# Patient Record
Sex: MALE | Race: WHITE | Employment: OTHER | URBAN - METROPOLITAN AREA
[De-identification: names, ages, dates, MRNs, and addresses within clinical notes are randomized per-mention and may not be internally consistent; named-entity substitution may affect disease eponyms.]

---

## 2017-02-06 ENCOUNTER — OFFICE VISIT (OUTPATIENT)
Dept: INTERNAL MEDICINE CLINIC | Age: 72
End: 2017-02-06

## 2017-02-06 VITALS
RESPIRATION RATE: 12 BRPM | SYSTOLIC BLOOD PRESSURE: 132 MMHG | TEMPERATURE: 98.2 F | DIASTOLIC BLOOD PRESSURE: 74 MMHG | WEIGHT: 199.4 LBS | HEART RATE: 86 BPM | OXYGEN SATURATION: 98 % | HEIGHT: 67 IN | BODY MASS INDEX: 31.3 KG/M2

## 2017-02-06 DIAGNOSIS — Z23 ENCOUNTER FOR IMMUNIZATION: ICD-10-CM

## 2017-02-06 DIAGNOSIS — I10 ESSENTIAL HYPERTENSION: Primary | ICD-10-CM

## 2017-02-06 DIAGNOSIS — M25.561 RIGHT KNEE PAIN, UNSPECIFIED CHRONICITY: ICD-10-CM

## 2017-02-06 RX ORDER — HYDROCHLOROTHIAZIDE 25 MG/1
25 TABLET ORAL DAILY
Qty: 90 TAB | Refills: 3 | Status: SHIPPED | OUTPATIENT
Start: 2017-02-06 | End: 2018-02-12 | Stop reason: SDUPTHER

## 2017-02-06 NOTE — PROGRESS NOTES
Mitra Borja 1945, is a 73 y.o. male, who is seen today for reevaluation of hypertension and mild obesity and impaired fasting glucose but also complaining of right knee pain. He was hiking in May last year when he jumped across some rocks and started having some pain in his right knee and has not totally gone away. He has had some stiffness in the knees for 20 years but not like this. He did see an orthopedist in [place] and was given cortisone injection without benefit. He says there has been some swelling especially early on and he iced it. He takes his blood pressure medicine regularly and otherwise feels well. Past Medical History   Diagnosis Date    GERD (gastroesophageal reflux disease)     Hypertension     Impaired fasting glucose February 2010     Current Outpatient Prescriptions   Medication Sig Dispense Refill    hydroCHLOROthiazide (HYDRODIURIL) 25 mg tablet Take 1 Tab by mouth daily. 90 Tab 3     Visit Vitals    /74    Pulse 86    Temp 98.2 °F (36.8 °C) (Oral)    Resp 12    Ht 5' 7\" (1.702 m)    Wt 199 lb 6.4 oz (90.4 kg)    SpO2 98%    BMI 31.23 kg/m2     Carotids are 2+ without bruits. Lungs are clear to percussion. Good breath sounds with no wheezing or crackles. Heart reveals a regular rhythm with normal S1 and S2 without murmur gallop click or rub. Apical impulse is not palpable. Abdomen is soft and nontender with no hepatosplenomegaly or masses and no bruits. Extremities reveal no clubbing cyanosis or edema. There is some slight popping of the patella with range of motion of the right knee but good stability of the cruciate and collateral ligaments and no effusion. He points to the area of pain which is medially in the lower knee along the joint line region. Assessment: #1. Hypertension controlled. He will continue hydrochlorothiazide 25 mg daily. #2 knee pain probably partly related injury but also arthritic changes on x-rays.   If this significantly worsens he will see his orthopedist again. #3.  Mild obesity, he will remain active healthy diet and avoid further weight gain. Follow-up in about 6 months he should have some lab done in the year. He will receive influenza vaccination and PPSV- 23 now    Vignesh Finch. Cedrick Johnston MD FACP    Please note: This document has been produced using voice recognition software. Unrecognized errors in transcription may be present.

## 2017-02-06 NOTE — MR AVS SNAPSHOT
Visit Information Date & Time Provider Department Dept. Phone Encounter #  
 2/6/2017  3:00 PM Carrie Schwartz MD [facility] of [address] [id number] Follow-up Instructions Return in about 6 months (around 8/6/2017). Upcoming Health Maintenance Date Due DTaP/Tdap/Td series (1 - Tdap) 7/7/1968 GLAUCOMA SCREENING Q2Y 7/7/2012 MEDICARE YEARLY EXAM 7/7/2012 COLONOSCOPY 1/11/2016 Allergies as of 2/6/2017  Review Complete On: 2/6/2017 By: Carrie Schwartz MD  
  
 Severity Noted Reaction Type Reactions Omeprazole High 06/17/2015    Hives, Swelling, Angioedema Current Immunizations  Reviewed on 2/6/2017 Name Date Influenza High Dose Vaccine PF  Incomplete, 10/9/2014 Influenza Vaccine (Quad) PF 12/23/2015 10:41 AM  
 Pneumococcal Conjugate (PCV-13) 12/23/2015 10:40 AM  
 Pneumococcal Polysaccharide (PPSV-23)  Incomplete Zoster 10/20/2009 Reviewed by Carrie Schwartz MD on 2/6/2017 at  3:29 PM  
You Were Diagnosed With   
  
 Codes Comments Essential hypertension    -  Primary ICD-10-CM: I10 
ICD-9-CM: 401.9 Encounter for immunization     ICD-10-CM: W72 ICD-9-CM: V03.89 Right knee pain, unspecified chronicity     ICD-10-CM: M25.561 ICD-9-CM: 719.46 Vitals BP Pulse Temp Resp Height(growth percentile) Weight(growth percentile) 132/74 86 98.2 °F (36.8 °C) (Oral) 12 5' 7\" (1.702 m) 199 lb 6.4 oz (90.4 kg) SpO2 BMI Smoking Status 98% 31.23 kg/m2 Never Smoker Vitals History BMI and BSA Data Body Mass Index Body Surface Area  
 31.23 kg/m 2 2.07 m 2 Preferred Pharmacy Pharmacy Name Phone [place] [id number] [facility] [address] [place] [address] [phone] Your Updated Medication List  
  
   
This list is accurate as of: 2/6/17  3:52 PM.  Always use your most recent med list.  
  
  
  
  
 hydroCHLOROthiazide 25 mg tablet Commonly known as:  HYDRODIURIL Take 1 Tab by mouth daily. Prescriptions Sent to Pharmacy Refills  
 hydroCHLOROthiazide (HYDRODIURIL) 25 mg tablet 3 Sig: Take 1 Tab by mouth daily. Class: Normal  
 Pharmacy: [address] [place] [address], [facility] [place] [address] #: [phone] Route: Oral  
  
We Performed the Following INFLUENZA VIRUS VACCINE, HIGH DOSE SEASONAL, PRESERVATIVE FREE [52412 CPT(R)] PNEUMOCOCCAL POLYSACCHARIDE VACCINE, 23-VALENT, ADULT OR IMMUNOSUPPRESSED PT DOSE, [46923 CPT(R)] Follow-up Instructions Return in about 6 months (around 8/6/2017). Introducing [address]! [facility] introduces [employer] patient portal. Now you can access parts of your medical record, email your doctor's office, and request medication refills online. 1. In your internet browser, go to [email or web address] 2. Click on the First Time User? Click Here link in the Sign In box. You will see the New Member Sign Up page. 3. Enter your [employer] Access Code exactly as it appears below. You will not need to use this code after youve completed the sign-up process. If you do not sign up before the expiration date, you must request a new code. · [employer] Access Code: [email or web address] Expires: 5/7/2017  3:01 PM 
 
4. Enter the last four digits of your Social Security Number (xxxx) and Date of Birth (mm/dd/yyyy) as indicated and click Submit. You will be taken to the next sign-up page. 5. Create a MyCoopt ID. This will be your [employer] login ID and cannot be changed, so think of one that is secure and easy to remember. 6. Create a [employer] password. You can change your password at any time. 7. Enter your Password Reset Question and Answer. This can be used at a later time if you forget your password. 8. Enter your e-mail address. You will receive e-mail notification when new information is available in [address] 9. Click Sign Up. You can now view and download portions of your medical record. 10. Click the Download Summary menu link to download a portable copy of your medical information. If you have questions, please visit the Frequently Asked Questions section of the [employer] website. Remember, [employer] is NOT to be used for urgent needs. For medical emergencies, dial 911. Now available from your iPhone and Android! Please provide this summary of care documentation to your next provider. Your primary care clinician is listed as Gonzalez Paredes. If you have any questions after today's visit, please call [phone].

## 2017-02-28 ENCOUNTER — OFFICE VISIT (OUTPATIENT)
Dept: INTERNAL MEDICINE CLINIC | Age: 72
End: 2017-02-28

## 2017-02-28 VITALS
WEIGHT: 197.2 LBS | SYSTOLIC BLOOD PRESSURE: 134 MMHG | HEART RATE: 89 BPM | BODY MASS INDEX: 30.95 KG/M2 | RESPIRATION RATE: 12 BRPM | HEIGHT: 67 IN | TEMPERATURE: 98.2 F | DIASTOLIC BLOOD PRESSURE: 84 MMHG | OXYGEN SATURATION: 98 %

## 2017-02-28 DIAGNOSIS — L03.032 PARONYCHIA OF TOE OF LEFT FOOT: Primary | ICD-10-CM

## 2017-02-28 RX ORDER — CEPHALEXIN 500 MG/1
500 CAPSULE ORAL 4 TIMES DAILY
Qty: 40 CAP | Refills: 0 | Status: SHIPPED | OUTPATIENT
Start: 2017-02-28 | End: 2019-09-04 | Stop reason: ALTCHOICE

## 2017-02-28 NOTE — MR AVS SNAPSHOT
Visit Information Date & Time Provider Department Dept. Phone Encounter #  
 2/28/2017  4:30 PM Brandon Will MD Internist of [address] [id number] Upcoming Health Maintenance Date Due DTaP/Tdap/Td series (1 - Tdap) 7/7/1968 GLAUCOMA SCREENING Q2Y 7/7/2012 MEDICARE YEARLY EXAM 7/7/2012 COLONOSCOPY 1/11/2016 Allergies as of 2/28/2017  Review Complete On: 2/28/2017 By: Brandon Will MD  
  
 Severity Noted Reaction Type Reactions Omeprazole High 06/17/2015    Hives, Swelling, Angioedema Current Immunizations  Reviewed on 2/6/2017 Name Date Influenza High Dose Vaccine PF 2/6/2017  3:52 PM, 10/9/2014 Influenza Vaccine (Quad) PF 12/23/2015 10:41 AM  
 Pneumococcal Conjugate (PCV-13) 12/23/2015 10:40 AM  
 Pneumococcal Polysaccharide (PPSV-23) 2/6/2017  3:53 PM  
 Zoster 10/20/2009 Not reviewed this visit You Were Diagnosed With   
  
 Codes Comments Paronychia of toe of left foot    -  Primary ICD-10-CM: P56.845 
ICD-9-CM: 681.11 Vitals BP  
  
  
  
  
  
 134/84 Vitals History BMI and BSA Data Body Mass Index Body Surface Area  
 30.89 kg/m 2 2.06 m 2 Preferred Pharmacy Pharmacy Name Phone [facility] [address] [phone] Your Updated Medication List  
  
   
This list is accurate as of: 2/28/17  5:09 PM.  Always use your most recent med list.  
  
  
  
  
 cephALEXin 500 mg capsule Commonly known as:  Negro Balboa Take 1 Cap by mouth four (4) times daily. hydroCHLOROthiazide 25 mg tablet Commonly known as:  HYDRODIURIL Take 1 Tab by mouth daily. Prescriptions Printed Refills  
 cephALEXin (KEFLEX) 500 mg capsule 0 Sig: Take 1 Cap by mouth four (4) times daily. Class: Print Route: Oral  
  
Introducing [facility]!    
 Diya Patel introduces [employer] patient portal. Now you can access parts of your medical record, email your doctor's office, and request medication refills online. 1. In your internet browser, go to [email or web address] 2. Click on the First Time User? Click Here link in the Sign In box. You will see the New Member Sign Up page. 3. Enter your [employer] Access Code exactly as it appears below. You will not need to use this code after youve completed the sign-up process. If you do not sign up before the expiration date, you must request a new code. · [employer] Access Code: [email or web address] Expires: 5/7/2017  3:01 PM 
 
4. Enter the last four digits of your Social Security Number (xxxx) and Date of Birth (mm/dd/yyyy) as indicated and click Submit. You will be taken to the next sign-up page. 5. Create a [employer] ID. This will be your [employer] login ID and cannot be changed, so think of one that is secure and easy to remember. 6. Create a [employer] password. You can change your password at any time. 7. Enter your Password Reset Question and Answer. This can be used at a later time if you forget your password. 8. Enter your e-mail address. You will receive e-mail notification when new information is available in [address] 9. Click Sign Up. You can now view and download portions of your medical record. 10. Click the Download Summary menu link to download a portable copy of your medical information. If you have questions, please visit the Frequently Asked Questions section of the [employer] website. Remember, [employer] is NOT to be used for urgent needs. For medical emergencies, dial 911. Now available from your iPhone and Android! Please provide this summary of care documentation to your next provider. Your primary care clinician is listed as Isela Driscoll. Tony Chen. If you have any questions after today's visit, please call [phone].

## 2017-02-28 NOTE — PROGRESS NOTES
Iesha Mancini 1945, is a 73 y.o. male, who is seen today for redness and soreness of his left great toe. This started 2 days ago and has gotten somewhat worse. It hurts a little bit to walk around but is very red. He has never had anything like this before. He has no history of gout. Past Medical History:   Diagnosis Date    GERD (gastroesophageal reflux disease)     Hypertension     Impaired fasting glucose February 2010     Current Outpatient Prescriptions   Medication Sig Dispense Refill    cephALEXin (KEFLEX) 500 mg capsule Take 1 Cap by mouth four (4) times daily. 40 Cap 0    hydroCHLOROthiazide (HYDRODIURIL) 25 mg tablet Take 1 Tab by mouth daily. 90 Tab 3     Visit Vitals    /84    Pulse 89    Temp 98.2 °F (36.8 °C) (Oral)    Resp 12    Ht 5' 7\" (1.702 m)    Wt 197 lb 3.2 oz (89.4 kg)    SpO2 98%    BMI 30.89 kg/m2     The right great toe reveals a lot of redness around the nail which is quite mycotic. Minimal tenderness of these tissues and no tenderness to press on the nail or nailbed. Good range of motion of the MTP joint without pain. Redness is distal to that area anyway. He has a good dorsalis pedis and posterior tibialis pulse. Paronychia probably strep, there is no evidence of abscess. Will treat with cephalexin 500 mg 4 times daily for 10 days and he will soak the foot in warm water intermittently. Follow-up as previously planned and he will let me know if this does not get better. Jamil Lainez MD FACP    Please note: This document has been produced using voice recognition software. Unrecognized errors in transcription may be present.

## 2017-03-02 ENCOUNTER — TELEPHONE (OUTPATIENT)
Dept: INTERNAL MEDICINE CLINIC | Age: 72
End: 2017-03-02

## 2017-03-02 RX ORDER — CLINDAMYCIN HYDROCHLORIDE 300 MG/1
300 CAPSULE ORAL 3 TIMES DAILY
Qty: 21 CAP | Refills: 0 | Status: SHIPPED | OUTPATIENT
Start: 2017-03-02 | End: 2017-07-14 | Stop reason: SDUPTHER

## 2017-03-02 NOTE — TELEPHONE ENCOUNTER
Pt's wife called- pt is taking Keflex since 02/28 is now breaking out with a rash around his mouth.     Please advise [phone]

## 2017-03-02 NOTE — TELEPHONE ENCOUNTER
Víctor Hall MD   You 5 minutes ago (11:54 AM)                 It sounds like an unusual reaction but I did send in a different antibiotic.  (Routing comment)                   Pt aware of message from RM

## 2017-04-14 ENCOUNTER — TELEPHONE (OUTPATIENT)
Dept: INTERNAL MEDICINE CLINIC | Age: 72
End: 2017-04-14

## 2017-04-14 DIAGNOSIS — M25.561 RIGHT KNEE PAIN, UNSPECIFIED CHRONICITY: Primary | ICD-10-CM

## 2017-04-14 NOTE — TELEPHONE ENCOUNTER
Patient would like referral to Dr Seng Yee in  [place] for [facility] Lo Therapy.     His appt is on May 11th 2017    Tel [phone]  Fax [phone]

## 2017-07-14 ENCOUNTER — OFFICE VISIT (OUTPATIENT)
Dept: INTERNAL MEDICINE CLINIC | Age: 72
End: 2017-07-14

## 2017-07-14 VITALS
RESPIRATION RATE: 14 BRPM | WEIGHT: 198.4 LBS | HEART RATE: 91 BPM | BODY MASS INDEX: 31.14 KG/M2 | TEMPERATURE: 98.2 F | HEIGHT: 67 IN | SYSTOLIC BLOOD PRESSURE: 122 MMHG | OXYGEN SATURATION: 98 % | DIASTOLIC BLOOD PRESSURE: 84 MMHG

## 2017-07-14 DIAGNOSIS — L03.115 CELLULITIS OF FOOT, RIGHT: Primary | ICD-10-CM

## 2017-07-14 RX ORDER — CLINDAMYCIN HYDROCHLORIDE 300 MG/1
300 CAPSULE ORAL 3 TIMES DAILY
Qty: 30 CAP | Refills: 0 | Status: SHIPPED | OUTPATIENT
Start: 2017-07-14 | End: 2019-09-04 | Stop reason: ALTCHOICE

## 2017-07-14 RX ORDER — TRIAMCINOLONE ACETONIDE 5 MG/G
CREAM TOPICAL
Qty: 15 G | Refills: 0 | Status: SHIPPED | OUTPATIENT
Start: 2017-07-14 | End: 2019-09-04 | Stop reason: ALTCHOICE

## 2017-07-14 NOTE — PROGRESS NOTES
Steve Garay 1945, is a 81 y.o. male, who is seen today for rash on his right lateral malleolus. He notes that yesterday and he tried using Neosporin without benefit. It is a little itchy but does not hurt. He had not noticed anything previously. No chills sweats or fever. No allergy to any antibiotics. Past Medical History:   Diagnosis Date    GERD (gastroesophageal reflux disease)     Hypertension     Impaired fasting glucose February 2010     Current Outpatient Prescriptions   Medication Sig Dispense Refill    clindamycin (CLEOCIN) 300 mg capsule Take 1 Cap by mouth three (3) times daily. 30 Cap 0    triamcinolone (ARISTOCORT) 0.5 % topical cream Apply a thin layer twice daily 15 g 0    hydroCHLOROthiazide (HYDRODIURIL) 25 mg tablet Take 1 Tab by mouth daily. 90 Tab 3    cephALEXin (KEFLEX) 500 mg capsule Take 1 Cap by mouth four (4) times daily. 40 Cap 0     Visit Vitals    /84    Pulse 91    Temp 98.2 °F (36.8 °C) (Oral)    Resp 14    Ht 5' 7\" (1.702 m)    Wt 198 lb 6.4 oz (90 kg)    SpO2 98%    BMI 31.07 kg/m2     There is a rounded area of somewhat raised dermatitis, sharply demarcated over the right lateral malleolus about 5 cm in diameter. No satellite areas. No tenderness. No weeping. Assessment: Area of dermatitis that is probably cellulitis slowly could be a component of contact dermatitis. Will treat with clindamycin 300 mg 3 times daily for 10 days and with triamcinolone cream 0.5% twice daily. Follow-up as previously planned    Hemant Muniz. Priyanka Berrios MD FACP    Please note: This document has been produced using voice recognition software. Unrecognized errors in transcription may be present.

## 2018-02-15 RX ORDER — HYDROCHLOROTHIAZIDE 25 MG/1
25 TABLET ORAL DAILY
Qty: 90 TAB | Refills: 3 | Status: SHIPPED | OUTPATIENT
Start: 2018-02-15 | End: 2018-12-19 | Stop reason: SDUPTHER

## 2019-02-21 RX ORDER — HYDROCHLOROTHIAZIDE 25 MG/1
TABLET ORAL
Qty: 90 TAB | Refills: 0 | Status: SHIPPED | OUTPATIENT
Start: 2019-02-21 | End: 2019-09-04 | Stop reason: SDUPTHER

## 2019-08-27 RX ORDER — HYDROCHLOROTHIAZIDE 25 MG/1
TABLET ORAL
Qty: 90 TAB | Status: CANCELLED | OUTPATIENT
Start: 2019-08-27

## 2019-09-04 ENCOUNTER — OFFICE VISIT (OUTPATIENT)
Dept: INTERNAL MEDICINE CLINIC | Age: 74
End: 2019-09-04

## 2019-09-04 VITALS
WEIGHT: 199 LBS | BODY MASS INDEX: 31.23 KG/M2 | OXYGEN SATURATION: 98 % | TEMPERATURE: 97.8 F | HEART RATE: 69 BPM | SYSTOLIC BLOOD PRESSURE: 124 MMHG | HEIGHT: 67 IN | DIASTOLIC BLOOD PRESSURE: 72 MMHG | RESPIRATION RATE: 12 BRPM

## 2019-09-04 DIAGNOSIS — E66.9 OBESITY (BMI 30-39.9): ICD-10-CM

## 2019-09-04 DIAGNOSIS — I10 PRIMARY HYPERTENSION: Primary | ICD-10-CM

## 2019-09-04 DIAGNOSIS — K21.9 GASTROESOPHAGEAL REFLUX DISEASE WITHOUT ESOPHAGITIS: ICD-10-CM

## 2019-09-04 RX ORDER — HYDROCHLOROTHIAZIDE 25 MG/1
TABLET ORAL
Qty: 90 TAB | Refills: 3 | Status: SHIPPED | OUTPATIENT
Start: 2019-09-04 | End: 2020-03-06 | Stop reason: SDUPTHER

## 2019-09-04 RX ORDER — RANITIDINE 150 MG/1
150 TABLET, FILM COATED ORAL 2 TIMES DAILY
COMMUNITY
End: 2019-09-04 | Stop reason: DRUGHIGH

## 2019-09-04 RX ORDER — PANTOPRAZOLE SODIUM 40 MG/1
40 TABLET, DELAYED RELEASE ORAL DAILY
Qty: 90 TAB | Refills: 3 | Status: SHIPPED | OUTPATIENT
Start: 2019-09-04 | End: 2020-03-06 | Stop reason: SDUPTHER

## 2019-09-04 NOTE — PROGRESS NOTES
Karie Mahmood 1945, is a 69 y.o. male, who is seen today for reevaluation of hypertension GERD and obesity. His weight is up 1 pound from 2 years ago when he was last seen. He takes hydrochlorothiazide every day. He remains very active with lots of kayaking and other physical activity, no dyspnea or chest pain of any sort. He does still have a lot of reflux symptoms and uses ranitidine 150 mg twice a day but still about 5 times a week in the evenings is having to use [place]. Symptoms cleared quickly with that. He does have to chew up his food very well or sometimes it seems to get stuck in the esophagus. Past Medical History:   Diagnosis Date    GERD (gastroesophageal reflux disease)     Hypertension     Impaired fasting glucose February 2010     Current Outpatient Medications   Medication Sig Dispense Refill    raNITIdine (ZANTAC) 150 mg tablet Take 150 mg by mouth two (2) times a day.  hydroCHLOROthiazide (HYDRODIURIL) 25 mg tablet TAKE 1 TABLET EVERY DAY 90 Tab 0     Visit Vitals  /72 (BP 1 Location: Left arm, BP Patient Position: Sitting)   Pulse 69   Temp 97.8 °F (36.6 °C) (Oral)   Resp 12   Ht 5' 7\" (1.702 m)   Wt 199 lb (90.3 kg)   SpO2 98%   BMI 31.17 kg/m²     Carotids are 2+ without bruits. No adenopathy or thyromegaly. Lungs are clear to percussion. Good breath sounds with no wheezing or crackles. Heart reveals a regular rhythm with normal S1 and S2 no murmur gallop click or rub. Apical impulse is not palpable. Abdomen is soft and nontender with no hepatosplenomegaly or masses and no bruits. Extremities reveal no clubbing cyanosis or edema. Pulses are 2+. Assessment: #1. Hypertension well controlled, he will continue hydrochlorothiazide 25 mg daily. #2.  GERD inadequately controlled with ranitidine, of recommended he switch to pantoprazole 40 mg daily and he can stop ranitidine when he does start pantoprazole.   If he continues to have reflux symptoms more than twice a week after he has been on this medicine for a month he will let me know. #3.  Mild obesity, he remains very active and somewhat muscular, will continue current activity level and cut back a little bit on calories. Jamil Otero Ala, MD FACP    Please note: This document has been produced using voice recognition software. Unrecognized errors in transcription may be present.

## 2020-02-03 ENCOUNTER — OFFICE VISIT (OUTPATIENT)
Dept: ORTHOPEDIC SURGERY | Facility: CLINIC | Age: 75
End: 2020-02-03

## 2020-02-03 VITALS
SYSTOLIC BLOOD PRESSURE: 159 MMHG | HEIGHT: 67 IN | DIASTOLIC BLOOD PRESSURE: 88 MMHG | WEIGHT: 205.8 LBS | BODY MASS INDEX: 32.3 KG/M2 | OXYGEN SATURATION: 96 % | HEART RATE: 69 BPM

## 2020-02-03 DIAGNOSIS — M25.462 EFFUSION OF LEFT KNEE: ICD-10-CM

## 2020-02-03 DIAGNOSIS — G89.29 CHRONIC PAIN OF RIGHT KNEE: ICD-10-CM

## 2020-02-03 DIAGNOSIS — M25.562 ACUTE PAIN OF LEFT KNEE: ICD-10-CM

## 2020-02-03 DIAGNOSIS — M17.12 LOCALIZED OSTEOARTHRITIS OF LEFT KNEE: Primary | ICD-10-CM

## 2020-02-03 DIAGNOSIS — M25.561 CHRONIC PAIN OF RIGHT KNEE: ICD-10-CM

## 2020-02-03 DIAGNOSIS — M17.11 LOCALIZED OSTEOARTHRITIS OF RIGHT KNEE: ICD-10-CM

## 2020-02-03 RX ORDER — BETAMETHASONE SODIUM PHOSPHATE AND BETAMETHASONE ACETATE 3; 3 MG/ML; MG/ML
6 INJECTION, SUSPENSION INTRA-ARTICULAR; INTRALESIONAL; INTRAMUSCULAR; SOFT TISSUE ONCE
Qty: 0.5 ML | Refills: 0
Start: 2020-02-03 | End: 2020-02-03

## 2020-02-03 NOTE — PROGRESS NOTES
Patient: Susana Mandujano                MRN: [id number]       SSN: [id number]  YOB: 1945        AGE: 69 y.o. SEX: male    PCP: Emmanuel Toro MD  02/03/20    Chief Complaint   Patient presents with    Knee Pain     left     HISTORY:  Susana Mandujano is a 69 y.o. male who sustained a left knee injury a few weeks ago. He twisted his knee when he stepped over a log while hiking in the woods. He has noticed knee swelling and tightness since that episode. He was not treated at an ED for this injury. He notes pain with stair climbing. He experiences startup pain after sitting. Pain Assessment  2/3/2020   Location of Pain Knee   Location Modifiers Left   Severity of Pain 8   Quality of Pain Dull;Aching; Throbbing   Duration of Pain A few hours   Frequency of Pain Several times daily   Aggravating Factors Kneeling;Walking;Standing   Limiting Behavior No   Relieving Factors Rest;Elevation; Ice   Result of Injury No     Occupation, etc:  Mr. 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 He volunteers as a  at [employer]. He served in [employer]. He has 2 Purple Hearts and 1 Bronze Star from his time in [facility]. He was chosen as the [employer] of the year in 2019. He lives in [place] with his wife. He has 2 sons, 2 grandsons, and 1 great grandson. Mr. Suman Ribeiro weighs 205 lbs and is 5'7\" tall. No results found for: HBA1C, HGBE8, KWC8HOQV, DHQ0GOGI, XVQ4HMRB  Weight Metrics 2/3/2020 9/4/2019 7/14/2017 2/28/2017 2/6/2017 4/21/2016 12/23/2015   Weight 205 lb 12.8 oz 199 lb 198 lb 6.4 oz 197 lb 3.2 oz 199 lb 6.4 oz 202 lb 200 lb   BMI 32.23 kg/m2 31.17 kg/m2 31.07 kg/m2 30.89 kg/m2 31.23 kg/m2 31.63 kg/m2 31.32 kg/m2       Patient Active Problem List   Diagnosis Code    Dyslipidemia E78.5    Primary hypertension I10    Obesity (BMI 30-39. 9) E66.9    Gastroesophageal reflux disease without esophagitis K21.9     REVIEW OF SYSTEMS: All Below are Negative except: See HPI   Constitutional: negative for fever, chills, and weight loss. Cardiovascular: negative for chest pain, claudication, leg swelling, SOB, MUELLER   Gastrointestinal: Negative for pain, N/V/C/D, Blood in stool or urine, dysuria, hematuria, incontinence, pelvic pain. Musculoskeletal: See HPI   Neurological: Negative for dizziness and weakness. Negative for headaches, Visual changes, confusion, seizures   Phychiatric/Behavioral: Negative for depression, memory loss, substance abuse. Extremities: Negative for hair changes, rash, or skin lesion changes. Hematologic: Negative for bleeding problems, bruising, pallor or swollen lymph nodes   Peripheral Vascular: No calf pain, no circulation deficits.     Social History     Socioeconomic History    Marital status:      Spouse name: Not on file    Number of children: Not on file    Years of education: Not on file    Highest education level: Not on file   Occupational History    Not on file   Social Needs    Financial resource strain: Not on file    Food insecurity:     Worry: Not on file     Inability: Not on file    Transportation needs:     Medical: Not on file     Non-medical: Not on file   Tobacco Use    Smoking status: Never Smoker    Smokeless tobacco: Never Used   Substance and Sexual Activity    Alcohol use: No    Drug use: No    Sexual activity: Not on file   Lifestyle    Physical activity:     Days per week: Not on file     Minutes per session: Not on file    Stress: Not on file   Relationships    Social connections:     Talks on phone: Not on file     Gets together: Not on file     Attends Mormon service: Not on file     Active member of club or organization: Not on file     Attends meetings of clubs or organizations: Not on file     Relationship status: Not on file    Intimate partner violence:     Fear of current or ex partner: Not on file     Emotionally abused: Not on file Physically abused: Not on file     Forced sexual activity: Not on file   Other Topics Concern    Not on file   Social History Narrative    Not on file      Allergies   Allergen Reactions    Omeprazole Hives, Swelling and Angioedema      Current Outpatient Medications   Medication Sig    hydroCHLOROthiazide (HYDRODIURIL) 25 mg tablet TAKE 1 TABLET EVERY DAY    pantoprazole (PROTONIX) 40 mg tablet Take 1 Tab by mouth daily. No current facility-administered medications for this visit. PHYSICAL EXAMINATION:  Visit Vitals  /88   Pulse 69   Ht 5' 7\" (1.702 m)   Wt 205 lb 12.8 oz (93.4 kg)   SpO2 96%   BMI 32.23 kg/m²      ORTHO EXAMINATION:  Examination Right knee Left knee   Skin Intact Intact   Range of motion 120-0 120-0   Effusion - +++   Medial joint line tenderness + +   Lateral joint line tenderness - -   Popliteal tenderness - -   Osteophytes palpable + +   Karlas - -   Patella crepitus + +   Anterior drawer - -   Lateral laxity - -   Medial laxity - -   Varus deformity - -   Valgus deformity - -   Pretibial edema - -   Calf tenderness - -       TIME OUT:  Chart reviewed for the following:   I, Girish Arana MD, have reviewed the History, Physical and updated the Allergic reactions for Bakerstad performed immediately prior to start of procedure:  Barbee Barthel, MD, have performed the following reviews on [employer] prior to the start of the procedure:          * Patient was identified by name and date of birth   * Agreement on procedure being performed was verified  * Risks and Benefits explained to the patient  * Procedure site verified and marked as necessary  * Patient was positioned for comfort  * Consent was obtained     Time: 11:09 AM     Date of procedure: 2/3/2020  Procedure performed by:  Girish Arana MD  Mr. Debra Fernandez tolerated the procedure well with no complications.       RADIOGRAPHS:  XR LEFT KNEE 2/3/20 PHYLLIS  IMPRESSION:  Three views - No fractures, no effusion, severe medial R>L joint space narrowing, + osteophytes present. Kellgren Magdi grade 4     IMPRESSION:      ICD-10-CM ICD-9-CM    1. Localized osteoarthritis of left knee M17.12 715.36 betamethasone (CELESTONE SOLUSPAN) 6 mg/mL injection      BETAMETHASONE ACETATE & SODIUM PHOSPHATE INJECTION 3 MG EA.      DRAIN/INJECT LARGE JOINT/BURSA      PROCEDURE AUTHORIZATION TO    2. Acute pain of left knee M25.562 719.46 AMB POC X-RAY KNEE 3 VIEW      betamethasone (CELESTONE SOLUSPAN) 6 mg/mL injection      BETAMETHASONE ACETATE & SODIUM PHOSPHATE INJECTION 3 MG EA.      DRAIN/INJECT LARGE JOINT/BURSA      PROCEDURE AUTHORIZATION TO    3. Localized osteoarthritis of right knee M17.11 715.36 betamethasone (CELESTONE SOLUSPAN) 6 mg/mL injection      BETAMETHASONE ACETATE & SODIUM PHOSPHATE INJECTION 3 MG EA.      DRAIN/INJECT LARGE JOINT/BURSA      PROCEDURE AUTHORIZATION TO    4. Chronic pain of right knee M25.561 719.46 betamethasone (CELESTONE SOLUSPAN) 6 mg/mL injection    G89.29 338.29 BETAMETHASONE ACETATE & SODIUM PHOSPHATE INJECTION 3 MG EA.      DRAIN/INJECT LARGE JOINT/BURSA      PROCEDURE AUTHORIZATION TO    5. Effusion of left knee M25.462 719.06      PLAN:  After timeout and under sterile conditions, left knee aspirated 25 cc of light yellow blood tinged fluid. The fluid was discarded. After discussing treatment options, patient's knees were injected with 4 cc Marcaine and 1/2 cc Celestone. We discussed possible need for left knee unicompartmental arthroplasty at some time in the future if pain continues. Consider visco supplementation if pain continues. He will follow up as needed.       Scribed by Jenifer Nolasco (Beena Bingham) as dictated by Elsy Yepez MD

## 2020-02-03 NOTE — PROGRESS NOTES
Verbal order given by Dr. Ivette Garcia to draw up 4 mL 0.25% Sensorcaine and 0.5 mL 30 mg/5mL Betamethasone. Verbal order given by Dr. Ivette Garcia to draw up 10 mL 0.25% Sensorcaine.

## 2020-03-06 RX ORDER — HYDROCHLOROTHIAZIDE 25 MG/1
25 TABLET ORAL DAILY
Qty: 90 TAB | Refills: 3 | Status: SHIPPED | OUTPATIENT
Start: 2020-03-06

## 2020-03-06 RX ORDER — PANTOPRAZOLE SODIUM 40 MG/1
40 TABLET, DELAYED RELEASE ORAL DAILY
Qty: 90 TAB | Refills: 3 | Status: SHIPPED | OUTPATIENT
Start: 2020-03-06

## 2020-03-06 NOTE — TELEPHONE ENCOUNTER
Previous scripts were sent to [address] [place] [address]    Last Visit: 9/4/19 with MD Gloria Stubbs  Next Appointment: none  Previous Refill Encounter(s): 9/4/19 #90 with 3 refills    Requested Prescriptions     Pending Prescriptions Disp Refills    hydroCHLOROthiazide (HYDRODIURIL) 25 mg tablet 90 Tab 3     Sig: Take 1 Tab by mouth daily.  pantoprazole (PROTONIX) 40 mg tablet 90 Tab 3     Sig: Take 1 Tab by mouth daily.

## 2020-04-17 ENCOUNTER — TELEPHONE (OUTPATIENT)
Dept: ORTHOPEDIC SURGERY | Age: 75
End: 2020-04-17

## 2020-04-17 NOTE — TELEPHONE ENCOUNTER
Patient states he thinks he has fluid on the knee again. Its swelling and painful. Pt last seen 2/2019. Gel injection auth not needed as per [facility] 02/05/2020.     Please advise/discuss with patient at Q#[phone]

## 2020-04-17 NOTE — TELEPHONE ENCOUNTER
Message given to the  to contact patient to schedule a f/u appointment with RICHIE Gallagher at the date below.

## 2020-04-20 ENCOUNTER — OFFICE VISIT (OUTPATIENT)
Dept: ORTHOPEDIC SURGERY | Age: 75
End: 2020-04-20

## 2020-04-20 VITALS
TEMPERATURE: 96.2 F | DIASTOLIC BLOOD PRESSURE: 99 MMHG | SYSTOLIC BLOOD PRESSURE: 173 MMHG | HEIGHT: 67 IN | HEART RATE: 81 BPM | WEIGHT: 200 LBS | BODY MASS INDEX: 31.39 KG/M2

## 2020-04-20 DIAGNOSIS — M25.562 ACUTE PAIN OF LEFT KNEE: Primary | ICD-10-CM

## 2020-04-20 RX ORDER — TRIAMCINOLONE ACETONIDE 40 MG/ML
40 INJECTION, SUSPENSION INTRA-ARTICULAR; INTRAMUSCULAR ONCE
Qty: 1 ML | Refills: 0
Start: 2020-04-20 | End: 2020-04-20

## 2020-04-20 NOTE — PROGRESS NOTES
Patient: Maverick Aguirre                MRN: [id number]       SSN: [id number]  YOB: 1945        AGE: 69 y.o. SEX: male    PCP: Jesus Mixon MD  04/20/20    C/o recurrent knee pain with swelling and decreased ROM    Chief Complaint   Patient presents with    Knee Pain     left     HISTORY:  Maverick Aguirre is a 69 y.o. male who sustained a left knee injury a few weeks ago. He twisted his knee when he stepped over a log while hiking in the woods. He has noticed knee swelling and tightness since that episode. He was not treated at an ED for this injury. He notes pain with stair climbing. He experiences startup pain after sitting. 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 He served in [employer]. He has 2 Purple Hearts and 1 Bronze Star from his time in [facility]. He was chosen as the [employer] of the year in 2019. He lives in [place] with his wife. He has 2 sons, 2 grandsons, and 1 great grandson. Mr. Tyrone Baldwin weighs 205 lbs and is 5'7\" tall. No results found for: HBA1C, HGBE8, SDO1DSOM, OCH0RXJP, QHN4TYHD  Weight Metrics 4/20/2020 2/3/2020 9/4/2019 7/14/2017 2/28/2017 2/6/2017 4/21/2016   Weight 200 lb 205 lb 12.8 oz 199 lb 198 lb 6.4 oz 197 lb 3.2 oz 199 lb 6.4 oz 202 lb   BMI 31.32 kg/m2 32.23 kg/m2 31.17 kg/m2 31.07 kg/m2 30.89 kg/m2 31.23 kg/m2 31.63 kg/m2       Patient Active Problem List   Diagnosis Code    Dyslipidemia E78.5    Primary hypertension I10    Obesity (BMI 30-39. 9) E66.9    Gastroesophageal reflux disease without esophagitis K21.9     REVIEW OF SYSTEMS: All Below are Negative except: See HPI   Constitutional: negative for fever, chills, and weight loss. Cardiovascular: negative for chest pain, claudication, leg swelling, SOB, MUELLER   Gastrointestinal: Negative for pain, N/V/C/D, Blood in stool or urine, dysuria, hematuria, incontinence, pelvic pain. Musculoskeletal: See HPI   Neurological: Negative for dizziness and weakness. Negative for headaches, Visual changes, confusion, seizures   Phychiatric/Behavioral: Negative for depression, memory loss, substance abuse. Extremities: Negative for hair changes, rash, or skin lesion changes. Hematologic: Negative for bleeding problems, bruising, pallor or swollen lymph nodes   Peripheral Vascular: No calf pain, no circulation deficits.     Social History     Socioeconomic History    Marital status:      Spouse name: Not on file    Number of children: Not on file    Years of education: Not on file    Highest education level: Not on file   Occupational History    Not on file   Social Needs    Financial resource strain: Not on file    Food insecurity     Worry: Not on file     Inability: Not on file    Transportation needs     Medical: Not on file     Non-medical: Not on file   Tobacco Use    Smoking status: Never Smoker    Smokeless tobacco: Never Used   Substance and Sexual Activity    Alcohol use: No    Drug use: No    Sexual activity: Not on file   Lifestyle    Physical activity     Days per week: Not on file     Minutes per session: Not on file    Stress: Not on file   Relationships    Social connections     Talks on phone: Not on file     Gets together: Not on file     Attends Sabianism service: Not on file     Active member of club or organization: Not on file     Attends meetings of clubs or organizations: Not on file     Relationship status: Not on file    Intimate partner violence     Fear of current or ex partner: Not on file     Emotionally abused: Not on file     Physically abused: Not on file     Forced sexual activity: Not on file   Other Topics Concern    Not on file   Social History Narrative    Not on file      Allergies   Allergen Reactions    Omeprazole Hives, Swelling and Angioedema      Current Outpatient Medications   Medication Sig    hydroCHLOROthiazide (HYDRODIURIL) 25 mg tablet Take 1 Tab by mouth daily.  pantoprazole (PROTONIX) 40 mg tablet Take 1 Tab by mouth daily. No current facility-administered medications for this visit.        PHYSICAL EXAMINATION:  Visit Vitals  BP (!) 173/99   Pulse 81   Temp 96.2 °F (35.7 °C)   Ht 5' 7\" (1.702 m)   Wt 200 lb (90.7 kg)   BMI 31.32 kg/m²      ORTHO EXAMINATION:  Examination Right knee Left knee   Skin Intact Intact   Range of motion 120-0 120-0   Effusion - ++   Medial joint line tenderness + +   Lateral joint line tenderness - -   Popliteal tenderness - -   Osteophytes palpable + +   Karlas - -   Patella crepitus + +   Anterior drawer - -   Lateral laxity - -   Medial laxity - -   Varus deformity - -   Valgus deformity - -   Pretibial edema - -   Calf tenderness - -       TIME OUT:  Chart reviewed for the following:   Dina RIVERA PA-C have reviewed the History, Physical and updated the Allergic reactions for Bakerstad performed immediately prior to start of procedure:  Dina RIVERA PA-C, have performed the following reviews on Fabrice Lesser prior to the start of the procedure:          * Patient was identified by name and date of birth   * Agreement on procedure being performed was verified  * Risks and Benefits explained to the patient  * Procedure site verified and marked as necessary  * Patient was positioned for comfort  * Consent was obtained     Time: 11:09 AM     Date of procedure: 4/20/2020  Procedure performed by:  Talita Sarah PA-C  Mr. Deyvi Kenny tolerated the procedure well with no complications. RADIOGRAPHS:  XR LEFT KNEE 2/3/20 PHYLLIS  IMPRESSION:  Three views - No fractures, no effusion, severe medial R>L joint space narrowing, + osteophytes present. Kellgren Magdi grade 4     IMPRESSION:      ICD-10-CM ICD-9-CM    1. Acute pain of left knee M25.562 719.46 DRAIN/INJECT LARGE JOINT/BURSA      TRIAMCINOLONE ACETONIDE INJ      triamcinolone acetonide (Kenalog) 40 mg/mL injection     PLAN:  After timeout and under sterile conditions, left knee aspirated 40cc of light yellow blood tinged fluid. The fluid was discarded. After discussing treatment options, patient's knees were injected with 7 cc Marcaine o.25 %  And 1 cc Nessa;log at 40mg/ml. We discussed possible need for left knee unicompartmental arthroplasty at some time in the future if pain continues. Consider visco supplementation if pain continues. He will follow up as needed.

## 2020-06-15 ENCOUNTER — TELEPHONE (OUTPATIENT)
Dept: ORTHOPEDIC SURGERY | Facility: CLINIC | Age: 75
End: 2020-06-15

## 2020-06-15 ENCOUNTER — OFFICE VISIT (OUTPATIENT)
Dept: ORTHOPEDIC SURGERY | Facility: CLINIC | Age: 75
End: 2020-06-15

## 2020-06-15 VITALS
HEART RATE: 77 BPM | WEIGHT: 198 LBS | BODY MASS INDEX: 31.08 KG/M2 | TEMPERATURE: 97.3 F | HEIGHT: 67 IN | SYSTOLIC BLOOD PRESSURE: 157 MMHG | DIASTOLIC BLOOD PRESSURE: 97 MMHG

## 2020-06-15 DIAGNOSIS — M17.0 PRIMARY OSTEOARTHRITIS OF BOTH KNEES: Primary | ICD-10-CM

## 2020-06-15 RX ORDER — HYALURONATE SODIUM 10 MG/ML
2 SYRINGE (ML) INTRAARTICULAR ONCE
Qty: 2 ML | Refills: 0
Start: 2020-06-15 | End: 2020-06-15

## 2020-06-15 NOTE — TELEPHONE ENCOUNTER
Please let him know that is not un common, rest today, ice as directed 15 minutes on and 45 off with a barrier over skin between ice source

## 2020-06-15 NOTE — PROGRESS NOTES
Patient: Rosette Kocher                MRN: [id number]       SSN: [id number]  YOB: 1945        AGE: 69 y.o. SEX: male    PCP: Antonietta Capellan MD  06/15/20    Chief Complaint   Patient presents with    Knee Pain     B/L     HISTORY:  Rosette Kocher is a 69 y.o. 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 He served in [employer]. He has 2 Purple Hearts and 1 Bronze Star from his time in [facility]. He was chosen as the [employer] of the year in 2019. He lives in [place] with his wife. He has 2 sons, 2 grandsons, and 1 great grandson. Mr. Yo Mccrary weighs 205 lbs and is 5'7\" tall. No results found for: HBA1C, HGBE8, BFH0ESBP, HVO1AYOY, VXT4LISW  Weight Metrics 6/15/2020 4/20/2020 2/3/2020 9/4/2019 7/14/2017 2/28/2017 2/6/2017   Weight 198 lb 200 lb 205 lb 12.8 oz 199 lb 198 lb 6.4 oz 197 lb 3.2 oz 199 lb 6.4 oz   BMI 31.01 kg/m2 31.32 kg/m2 32.23 kg/m2 31.17 kg/m2 31.07 kg/m2 30.89 kg/m2 31.23 kg/m2       Patient Active Problem List   Diagnosis Code    Dyslipidemia E78.5    Primary hypertension I10    Obesity (BMI 30-39. 9) E66.9    Gastroesophageal reflux disease without esophagitis K21.9     REVIEW OF SYSTEMS: All Below are Negative except: See HPI   Constitutional: negative for fever, chills, and weight loss.    Cardiovascular: negative for chest pain, claudication, leg swelling, SOB, MUELLER   Gastrointestinal: Negative for pain, N/V/C/D, Blood in stool or urine, dysuria, hematuria, incontinence, pelvic pain. Musculoskeletal: See HPI   Neurological: Negative for dizziness and weakness. Negative for headaches, Visual changes, confusion, seizures   Phychiatric/Behavioral: Negative for depression, memory loss, substance abuse. Extremities: Negative for hair changes, rash, or skin lesion changes. Hematologic: Negative for bleeding problems, bruising, pallor or swollen lymph nodes   Peripheral Vascular: No calf pain, no circulation deficits.     Social History     Socioeconomic History    Marital status:      Spouse name: Not on file    Number of children: Not on file    Years of education: Not on file    Highest education level: Not on file   Occupational History    Not on file   Social Needs    Financial resource strain: Not on file    Food insecurity     Worry: Not on file     Inability: Not on file    Transportation needs     Medical: Not on file     Non-medical: Not on file   Tobacco Use    Smoking status: Never Smoker    Smokeless tobacco: Never Used   Substance and Sexual Activity    Alcohol use: No    Drug use: No    Sexual activity: Not on file   Lifestyle    Physical activity     Days per week: Not on file     Minutes per session: Not on file    Stress: Not on file   Relationships    Social connections     Talks on phone: Not on file     Gets together: Not on file     Attends Orthodoxy service: Not on file     Active member of club or organization: Not on file     Attends meetings of clubs or organizations: Not on file     Relationship status: Not on file    Intimate partner violence     Fear of current or ex partner: Not on file     Emotionally abused: Not on file     Physically abused: Not on file     Forced sexual activity: Not on file   Other Topics Concern    Not on file   Social History Narrative    Not on file      Allergies   Allergen Reactions    Omeprazole Hives, Swelling and Angioedema Current Outpatient Medications   Medication Sig    hydroCHLOROthiazide (HYDRODIURIL) 25 mg tablet Take 1 Tab by mouth daily.  pantoprazole (PROTONIX) 40 mg tablet Take 1 Tab by mouth daily. No current facility-administered medications for this visit. PHYSICAL EXAMINATION:  Visit Vitals  BP (!) 157/97   Pulse 77   Temp 97.3 °F (36.3 °C)   Ht 5' 7\" (1.702 m)   Wt 198 lb (89.8 kg)   BMI 31.01 kg/m²      ORTHO EXAMINATION:  Examination Right knee Left knee   Skin Intact Intact   Range of motion 120-0 120-0   Effusion - -   Medial joint line tenderness + +   Lateral joint line tenderness - -   Popliteal tenderness - -   Osteophytes palpable + +   Karlas - -   Patella crepitus + +   Anterior drawer - -   Lateral laxity - -   Medial laxity - -   Varus deformity - -   Valgus deformity - -   Pretibial edema - -   Calf tenderness - -       TIME OUT:  Chart reviewed for the following:   Nasra RIVERA PA-C, have reviewed the History, Physical and updated the Allergic reactions for Bakerstad performed immediately prior to start of procedure:  Nasra RIVERA PA-C, have performed the following reviews on Selene Fisher prior to the start of the procedure:          * Patient was identified by name and date of birth   * Agreement on procedure being performed was verified  * Risks and Benefits explained to the patient  * Procedure site verified and marked as necessary  * Patient was positioned for comfort  * Consent was obtained     Time: 0909 AM     Date of procedure: 6/15/2020  Procedure performed by:  Adalid Sandoval PA-C  Mr. Delia Engel tolerated the procedure well with no complications. RADIOGRAPHS:  XR LEFT KNEE 2/3/20 PHYLLIS  IMPRESSION:  Three views - No fractures, no effusion, severe medial R>L joint space narrowing, + osteophytes present. Kellgren Magdi grade 4     IMPRESSION:    No diagnosis found.   PLAN:  After timeout and under sterile conditions, both knee injected with Euflexxa 2cc with no complications. We discussed possible need for left knee unicompartmental arthroplasty at some time in the future if pain continues. Consider visco supplementation if pain continues. He will follow up as needed.

## 2020-06-15 NOTE — TELEPHONE ENCOUNTER
Spoke with patient informed Provides Ernestina Cortes states symptoms are not uncommon, suggest rest today, ice as directed 15 minutes on and 45 off with a barrier over skin between ice source.  Patient acknowledges understanding no further questions or concerns

## 2020-06-15 NOTE — TELEPHONE ENCOUNTER
Patient called stating that he just received his first Euflexxa injection today and when he got home he could hardly walk. He was not sure if this is something to be expected or not.  Please advise patient at [phone]

## 2020-06-22 ENCOUNTER — OFFICE VISIT (OUTPATIENT)
Dept: ORTHOPEDIC SURGERY | Facility: CLINIC | Age: 75
End: 2020-06-22

## 2020-06-22 VITALS — TEMPERATURE: 97.4 F | WEIGHT: 198 LBS | BODY MASS INDEX: 31.01 KG/M2

## 2020-06-22 DIAGNOSIS — M17.0 PRIMARY OSTEOARTHRITIS OF BOTH KNEES: Primary | ICD-10-CM

## 2020-06-22 RX ORDER — HYALURONATE SODIUM 10 MG/ML
2 SYRINGE (ML) INTRAARTICULAR ONCE
Qty: 2 ML | Refills: 0
Start: 2020-06-22 | End: 2020-06-22

## 2020-06-22 NOTE — PROGRESS NOTES
Patient: Matty Cedillo                MRN: [id number]       SSN: [id number]  YOB: 1945        AGE: 69 y.o. SEX: male    PCP: Az Ely MD  06/22/20    Chief Complaint   Patient presents with    Knee Pain     Bilateral      HISTORY:  Matty Cedillo is a 69 y.o. 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 He served in [employer]. He has 2 Purple Hearts and 1 Bronze Star from his time in [facility]. He was chosen as the [place] and [employer] of the year in 2019. He lives in [place] with his wife. He has 2 sons, 2 grandsons, and 1 great grandson. Mr. Alberto Leal weighs 205 lbs and is 5'7\" tall. No results found for: HBA1C, HGBE8, IWZ8KUNP, ACA3BCOS, [id number]  Weight Metrics 6/22/2020 6/15/2020 4/20/2020 2/3/2020 9/4/2019 7/14/2017 2/28/2017   Weight 198 lb 198 lb 200 lb 205 lb 12.8 oz 199 lb 198 lb 6.4 oz 197 lb 3.2 oz   BMI 31.01 kg/m2 31.01 kg/m2 31.32 kg/m2 32.23 kg/m2 31.17 kg/m2 31.07 kg/m2 30.89 kg/m2       Patient Active Problem List   Diagnosis Code    Dyslipidemia E78.5    Primary hypertension I10    Obesity (BMI 30-39. 9) E66.9    Gastroesophageal reflux disease without esophagitis K21.9     REVIEW OF SYSTEMS: All Below are Negative except: See HPI   Constitutional: negative for fever, chills, and weight loss.    Cardiovascular: negative for chest pain, claudication, leg swelling, SOB, MUELLER   Gastrointestinal: Negative for pain, N/V/C/D, Blood in stool or urine, dysuria, hematuria, incontinence, pelvic pain. Musculoskeletal: See HPI   Neurological: Negative for dizziness and weakness. Negative for headaches, Visual changes, confusion, seizures   Phychiatric/Behavioral: Negative for depression, memory loss, substance abuse. Extremities: Negative for hair changes, rash, or skin lesion changes. Hematologic: Negative for bleeding problems, bruising, pallor or swollen lymph nodes   Peripheral Vascular: No calf pain, no circulation deficits.     Social History     Socioeconomic History    Marital status:      Spouse name: Not on file    Number of children: Not on file    Years of education: Not on file    Highest education level: Not on file   Occupational History    Not on file   Social Needs    Financial resource strain: Not on file    Food insecurity     Worry: Not on file     Inability: Not on file    Transportation needs     Medical: Not on file     Non-medical: Not on file   Tobacco Use    Smoking status: Never Smoker    Smokeless tobacco: Never Used   Substance and Sexual Activity    Alcohol use: No    Drug use: No    Sexual activity: Not on file   Lifestyle    Physical activity     Days per week: Not on file     Minutes per session: Not on file    Stress: Not on file   Relationships    Social connections     Talks on phone: Not on file     Gets together: Not on file     Attends Jehovah's witness service: Not on file     Active member of club or organization: Not on file     Attends meetings of clubs or organizations: Not on file     Relationship status: Not on file    Intimate partner violence     Fear of current or ex partner: Not on file     Emotionally abused: Not on file     Physically abused: Not on file     Forced sexual activity: Not on file   Other Topics Concern    Not on file   Social History Narrative    Not on file      Allergies   Allergen Reactions    Omeprazole Hives, Swelling and Angioedema      Current Outpatient Medications   Medication Sig    sodium hyaluronate (SUPARTZ FX/HYALGAN/GENIVSC) 10 mg/mL syrg injection 2 mL by Intra artICUlar route once for 1 dose.  hydroCHLOROthiazide (HYDRODIURIL) 25 mg tablet Take 1 Tab by mouth daily.  pantoprazole (PROTONIX) 40 mg tablet Take 1 Tab by mouth daily. No current facility-administered medications for this visit. PHYSICAL EXAMINATION:  Visit Vitals  Temp 97.4 °F (36.3 °C) (Oral)   Wt 198 lb (89.8 kg)   BMI 31.01 kg/m²      ORTHO EXAMINATION:  Examination Right knee Left knee   Skin Intact Intact   Range of motion 120-0 120-0   Effusion - -   Medial joint line tenderness + +   Lateral joint line tenderness - -   Popliteal tenderness - -   Osteophytes palpable + +   Karlas - -   Patella crepitus + +   Anterior drawer - -   Lateral laxity - -   Medial laxity - -   Varus deformity - -   Valgus deformity - -   Pretibial edema - -   Calf tenderness - -       TIME OUT:  Chart reviewed for the following:   Laurita RIVERA PA-C, have reviewed the History, Physical and updated the Allergic reactions for Bakerstad performed immediately prior to start of procedure:  Laurita RIVERA PA-C, have performed the following reviews on Sherre Lesches prior to the start of the procedure:          * Patient was identified by name and date of birth   * Agreement on procedure being performed was verified  * Risks and Benefits explained to the patient  * Procedure site verified and marked as necessary  * Patient was positioned for comfort  * Consent was obtained     Time: 0909 AM     Date of procedure: 6/22/2020  Procedure performed by:  Obi Osorio PA-C  Mr. Ivory Feliciano tolerated the procedure well with no complications. RADIOGRAPHS:  XR LEFT KNEE 2/3/20 PHYLLIS  IMPRESSION:  Three views - No fractures, no effusion, severe medial R>L joint space narrowing, + osteophytes present.  Kellgren Magdi grade 4     IMPRESSION: ICD-10-CM ICD-9-CM    1. Primary osteoarthritis of both knees M17.0 715.16 MD DRAIN/INJECT LARGE JOINT/BURSA      EUFLEXXA INJECTION PER DOSE      sodium hyaluronate (SUPARTZ FX/HYALGAN/GENIVSC) 10 mg/mL syrg injection     PLAN:  After timeout and under sterile conditions, both knee injected with Euflexxa 2cc with no complications. Tonya Wang He will follow up in one week for Euflexxa #2.

## 2020-06-29 ENCOUNTER — OFFICE VISIT (OUTPATIENT)
Dept: ORTHOPEDIC SURGERY | Facility: CLINIC | Age: 75
End: 2020-06-29

## 2020-06-29 VITALS
HEART RATE: 80 BPM | TEMPERATURE: 98 F | BODY MASS INDEX: 30.73 KG/M2 | RESPIRATION RATE: 16 BRPM | SYSTOLIC BLOOD PRESSURE: 155 MMHG | DIASTOLIC BLOOD PRESSURE: 98 MMHG | WEIGHT: 195.8 LBS | OXYGEN SATURATION: 95 % | HEIGHT: 67 IN

## 2020-06-29 DIAGNOSIS — M17.0 PRIMARY OSTEOARTHRITIS OF BOTH KNEES: Primary | ICD-10-CM

## 2020-06-29 RX ORDER — HYALURONATE SODIUM 10 MG/ML
2 SYRINGE (ML) INTRAARTICULAR ONCE
Qty: 2 ML | Refills: 0
Start: 2020-06-29 | End: 2020-06-29

## 2020-06-29 NOTE — PROGRESS NOTES
1. Have you been to the ER, urgent care clinic since your last visit? Hospitalized since your last visit? No    2. Have you seen or consulted any other health care providers outside of the [address] [place] [address] since your last visit? Include any pap smears or colon screening.  No

## 2020-06-29 NOTE — PROGRESS NOTES
Patient: Carlos Valles                MRN: [id number]       SSN: [id number]  YOB: 1945        AGE: 69 y.o. SEX: male    PCP: Mina Armstrong MD  06/29/20    Chief Complaint   Patient presents with    Knee Pain     Josiah knee pain     HISTORY:  Carlos Valles is a 69 y.o. 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 He volunteers as a  at [employer]. He served in [employer]. He has 2 Purple Hearts and 1 Bronze Star from his time in [facility]. He was chosen as the [employer] of the year in 2019. He lives in [place] [place] with his wife. He has 2 sons, 2 grandsons, and 1 great grandson. Mr. Alexi Alejo weighs 205 lbs and is 5'7\" tall. No results found for: HBA1C, HGBE8, IJG5QCOD, LIJ1VXNI, VUP2PFQR  Weight Metrics 6/29/2020 6/22/2020 6/15/2020 4/20/2020 2/3/2020 9/4/2019 7/14/2017   Weight 195 lb 12.8 oz 198 lb 198 lb 200 lb 205 lb 12.8 oz 199 lb 198 lb 6.4 oz   BMI 30.67 kg/m2 31.01 kg/m2 31.01 kg/m2 31.32 kg/m2 32.23 kg/m2 31.17 kg/m2 31.07 kg/m2       Patient Active Problem List   Diagnosis Code    Dyslipidemia E78.5    Primary hypertension I10    Obesity (BMI 30-39. 9) E66.9    Gastroesophageal reflux disease without esophagitis K21.9     REVIEW OF SYSTEMS: All Below are Negative except: See HPI   Constitutional: negative for fever, chills, and weight loss.    Cardiovascular: negative for chest pain, claudication, leg swelling, SOB, MUELLER   Gastrointestinal: Negative for pain, N/V/C/D, Blood in stool or urine, dysuria, hematuria, incontinence, pelvic pain. Musculoskeletal: See HPI   Neurological: Negative for dizziness and weakness. Negative for headaches, Visual changes, confusion, seizures   Phychiatric/Behavioral: Negative for depression, memory loss, substance abuse. Extremities: Negative for hair changes, rash, or skin lesion changes. Hematologic: Negative for bleeding problems, bruising, pallor or swollen lymph nodes   Peripheral Vascular: No calf pain, no circulation deficits.     Social History     Socioeconomic History    Marital status:      Spouse name: Not on file    Number of children: Not on file    Years of education: Not on file    Highest education level: Not on file   Occupational History    Not on file   Social Needs    Financial resource strain: Not on file    Food insecurity     Worry: Not on file     Inability: Not on file    Transportation needs     Medical: Not on file     Non-medical: Not on file   Tobacco Use    Smoking status: Never Smoker    Smokeless tobacco: Never Used   Substance and Sexual Activity    Alcohol use: No    Drug use: No    Sexual activity: Not on file   Lifestyle    Physical activity     Days per week: Not on file     Minutes per session: Not on file    Stress: Not on file   Relationships    Social connections     Talks on phone: Not on file     Gets together: Not on file     Attends Alevism service: Not on file     Active member of club or organization: Not on file     Attends meetings of clubs or organizations: Not on file     Relationship status: Not on file    Intimate partner violence     Fear of current or ex partner: Not on file     Emotionally abused: Not on file     Physically abused: Not on file     Forced sexual activity: Not on file   Other Topics Concern    Not on file   Social History Narrative    Not on file      Allergies   Allergen Reactions    Omeprazole Hives, Swelling and Angioedema      Current Outpatient Medications   Medication Sig    hydroCHLOROthiazide (HYDRODIURIL) 25 mg tablet Take 1 Tab by mouth daily.  pantoprazole (PROTONIX) 40 mg tablet Take 1 Tab by mouth daily. No current facility-administered medications for this visit. PHYSICAL EXAMINATION:  Visit Vitals  BP (!) 155/98   Pulse 80   Temp 98 °F (36.7 °C) (Oral)   Resp 16   Ht 5' 7\" (1.702 m)   Wt 195 lb 12.8 oz (88.8 kg)   SpO2 95%   BMI 30.67 kg/m²      ORTHO EXAMINATION:  Examination Right knee Left knee   Skin Intact Intact   Range of motion 120-0 120-0   Effusion - -   Medial joint line tenderness + +   Lateral joint line tenderness - -   Popliteal tenderness - -   Osteophytes palpable + +   Karlas - -   Patella crepitus + +   Anterior drawer - -   Lateral laxity - -   Medial laxity - -   Varus deformity - -   Valgus deformity - -   Pretibial edema - -   Calf tenderness - -       TIME OUT:  Chart reviewed for the following:   Nasra RIVERA PA-C, have reviewed the History, Physical and updated the Allergic reactions for Bakerstad performed immediately prior to start of procedure:  Nasra RIVERA PA-C, have performed the following reviews on Selene Fisher prior to the start of the procedure:          * Patient was identified by name and date of birth   * Agreement on procedure being performed was verified  * Risks and Benefits explained to the patient  * Procedure site verified and marked as necessary  * Patient was positioned for comfort  * Consent was obtained     Time: 0909 AM     Date of procedure: 6/29/2020  Procedure performed by:  Adalid Sandoval PA-C  Mr. Delia Engel tolerated the procedure well with no complications. RADIOGRAPHS:  XR LEFT KNEE 2/3/20 PHYLLIS  IMPRESSION:  Three views - No fractures, no effusion, severe medial R>L joint space narrowing, + osteophytes present.  Kellgren Magdi grade 4     IMPRESSION:    No diagnosis found. PLAN:  After timeout and under sterile conditions, both knee injected with Euflexxa 2cc with no complications. Isac Okeefe He will follow up prn. Please Note:    The above patient was treated with the assistance of the [employer] Ultrasound device. Image(s) captured, saved, printed, and copied to chart.

## 2020-08-14 ENCOUNTER — OFFICE VISIT (OUTPATIENT)
Dept: ORTHOPEDIC SURGERY | Facility: CLINIC | Age: 75
End: 2020-08-14

## 2020-08-14 VITALS
DIASTOLIC BLOOD PRESSURE: 87 MMHG | OXYGEN SATURATION: 95 % | HEART RATE: 90 BPM | TEMPERATURE: 96.6 F | SYSTOLIC BLOOD PRESSURE: 145 MMHG | WEIGHT: 187 LBS | HEIGHT: 67 IN | BODY MASS INDEX: 29.35 KG/M2

## 2020-08-14 DIAGNOSIS — M17.11 PRIMARY OSTEOARTHRITIS OF RIGHT KNEE: ICD-10-CM

## 2020-08-14 DIAGNOSIS — M17.12 PRIMARY OSTEOARTHRITIS OF LEFT KNEE: Primary | ICD-10-CM

## 2020-08-14 DIAGNOSIS — G89.29 CHRONIC PAIN OF RIGHT KNEE: ICD-10-CM

## 2020-08-14 DIAGNOSIS — M25.562 CHRONIC PAIN OF LEFT KNEE: ICD-10-CM

## 2020-08-14 DIAGNOSIS — M25.561 CHRONIC PAIN OF RIGHT KNEE: ICD-10-CM

## 2020-08-14 DIAGNOSIS — G89.29 CHRONIC PAIN OF LEFT KNEE: ICD-10-CM

## 2020-08-14 RX ORDER — BETAMETHASONE SODIUM PHOSPHATE AND BETAMETHASONE ACETATE 3; 3 MG/ML; MG/ML
6 INJECTION, SUSPENSION INTRA-ARTICULAR; INTRALESIONAL; INTRAMUSCULAR; SOFT TISSUE ONCE
Qty: 0.5 ML | Refills: 0
Start: 2020-08-14 | End: 2020-08-14
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
## 2020-08-25 DIAGNOSIS — Z01.811 PRE-OPERATIVE RESPIRATORY EXAMINATION: ICD-10-CM

## 2020-08-25 DIAGNOSIS — Z01.812 PRE-OPERATIVE LABORATORY EXAMINATION: ICD-10-CM

## 2020-08-25 DIAGNOSIS — Z01.810 PRE-OPERATIVE CARDIOVASCULAR EXAMINATION: ICD-10-CM

## 2020-08-25 DIAGNOSIS — M17.12 PRIMARY OSTEOARTHRITIS OF LEFT KNEE: Primary | ICD-10-CM

## 2020-08-26 DIAGNOSIS — Z01.812 PRE-OPERATIVE LABORATORY EXAMINATION: ICD-10-CM

## 2020-08-26 DIAGNOSIS — M17.12 PRIMARY OSTEOARTHRITIS OF LEFT KNEE: Primary | ICD-10-CM

## 2020-09-10 ENCOUNTER — HOSPITAL ENCOUNTER (OUTPATIENT)
Dept: MRI IMAGING | Age: 75
Discharge: HOME | End: 2020-09-10
Attending: SPECIALIST
Payer: MEDICARE

## 2020-09-10 ENCOUNTER — HOSPITAL ENCOUNTER (OUTPATIENT)
Dept: GENERAL RADIOLOGY | Age: 75
Discharge: HOME | End: 2020-09-10
Attending: SPECIALIST
Payer: MEDICARE

## 2020-09-10 DIAGNOSIS — M79.5 FOREIGN BODY (FB) IN SOFT TISSUE: ICD-10-CM

## 2020-09-10 DIAGNOSIS — M79.5 FOREIGN BODY (FB) IN SOFT TISSUE: Primary | ICD-10-CM

## 2020-09-10 DIAGNOSIS — M25.562 CHRONIC PAIN OF LEFT KNEE: ICD-10-CM

## 2020-09-10 DIAGNOSIS — M17.12 PRIMARY OSTEOARTHRITIS OF LEFT KNEE: ICD-10-CM

## 2020-09-10 DIAGNOSIS — G89.29 CHRONIC PAIN OF LEFT KNEE: ICD-10-CM

## 2020-09-10 PROCEDURE — 74018 RADEX ABDOMEN 1 VIEW: CPT

## 2020-09-10 PROCEDURE — 70250 X-RAY EXAM OF SKULL: CPT

## 2020-09-10 PROCEDURE — 71046 X-RAY EXAM CHEST 2 VIEWS: CPT

## 2020-09-10 PROCEDURE — 70030 X-RAY EYE FOR FOREIGN BODY: CPT
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
## 2020-09-30 ENCOUNTER — HOSPITAL ENCOUNTER (OUTPATIENT)
Dept: PREADMISSION TESTING | Age: 75
Discharge: HOME | End: 2020-09-30
Payer: MEDICARE

## 2020-09-30 DIAGNOSIS — Z01.810 PRE-OPERATIVE CARDIOVASCULAR EXAMINATION: ICD-10-CM

## 2020-09-30 DIAGNOSIS — M17.12 PRIMARY OSTEOARTHRITIS OF LEFT KNEE: ICD-10-CM

## 2020-09-30 DIAGNOSIS — Z01.812 PRE-OPERATIVE LABORATORY EXAMINATION: ICD-10-CM

## 2020-09-30 LAB
ABO + RH BLD: NORMAL
ANION GAP SERPL CALC-SCNC: 9 MMOL/L (ref 3–18)
APPEARANCE UR: CLEAR
APTT PPP: 26.5 SEC (ref 23–36.4)
BASOPHILS # BLD: 0 K/UL (ref 0–0.1)
BASOPHILS NFR BLD: 1 % (ref 0–2)
BILIRUB UR QL: NEGATIVE
BLOOD GROUP ANTIBODIES SERPL: NORMAL
BUN SERPL-MCNC: 24 MG/DL (ref 7–18)
BUN/CREAT SERPL: 19 (ref 12–20)
CALCIUM SERPL-MCNC: 8.9 MG/DL (ref 8.5–10.1)
CHLORIDE SERPL-SCNC: 98 MMOL/L (ref 100–111)
CO2 SERPL-SCNC: 28 MMOL/L (ref 21–32)
COLOR UR: YELLOW
CREAT SERPL-MCNC: 1.26 MG/DL (ref 0.6–1.3)
DIFFERENTIAL METHOD BLD: NORMAL
EOSINOPHIL # BLD: 0.2 K/UL (ref 0–0.4)
EOSINOPHIL NFR BLD: 3 % (ref 0–5)
ERYTHROCYTE [DISTWIDTH] IN BLOOD BY AUTOMATED COUNT: 12.8 % (ref 11.6–14.5)
GLUCOSE SERPL-MCNC: 566 MG/DL (ref 74–99)
GLUCOSE UR STRIP.AUTO-MCNC: >1000 MG/DL
HBA1C MFR BLD: 12.7 % (ref 4.2–5.6)
HCT VFR BLD AUTO: 42.2 % (ref 36–48)
HGB BLD-MCNC: 15.4 G/DL (ref 13–16)
HGB UR QL STRIP: NEGATIVE
INR PPP: 0.9 (ref 0.8–1.2)
KETONES UR QL STRIP.AUTO: NEGATIVE MG/DL
LEUKOCYTE ESTERASE UR QL STRIP.AUTO: NEGATIVE
LYMPHOCYTES # BLD: 1.3 K/UL (ref 0.9–3.6)
LYMPHOCYTES NFR BLD: 24 % (ref 21–52)
MCH RBC QN AUTO: 30.7 PG (ref 24–34)
MCHC RBC AUTO-ENTMCNC: 36.5 G/DL (ref 31–37)
MCV RBC AUTO: 84.1 FL (ref 74–97)
MONOCYTES # BLD: 0.4 K/UL (ref 0.05–1.2)
MONOCYTES NFR BLD: 7 % (ref 3–10)
NEUTS SEG # BLD: 3.7 K/UL (ref 1.8–8)
NEUTS SEG NFR BLD: 65 % (ref 40–73)
NITRITE UR QL STRIP.AUTO: NEGATIVE
PH UR STRIP: 5 [PH] (ref 5–8)
PLATELET # BLD AUTO: 235 K/UL (ref 135–420)
PMV BLD AUTO: 10 FL (ref 9.2–11.8)
POTASSIUM SERPL-SCNC: 3.8 MMOL/L (ref 3.5–5.5)
PROT UR STRIP-MCNC: NEGATIVE MG/DL
PROTHROMBIN TIME: 12.3 SEC (ref 11.5–15.2)
RBC # BLD AUTO: 5.02 M/UL (ref 4.7–5.5)
SODIUM SERPL-SCNC: 135 MMOL/L (ref 136–145)
SP GR UR REFRACTOMETRY: >1.03 (ref 1–1.03)
SPECIMEN EXP DATE BLD: NORMAL
UROBILINOGEN UR QL STRIP.AUTO: 0.2 EU/DL (ref 0.2–1)
WBC # BLD AUTO: 5.6 K/UL (ref 4.6–13.2)

## 2020-09-30 PROCEDURE — 93005 ELECTROCARDIOGRAM TRACING: CPT

## 2020-09-30 PROCEDURE — 81003 URINALYSIS AUTO W/O SCOPE: CPT

## 2020-09-30 PROCEDURE — 85610 PROTHROMBIN TIME: CPT

## 2020-09-30 PROCEDURE — 85730 THROMBOPLASTIN TIME PARTIAL: CPT

## 2020-09-30 PROCEDURE — 36415 COLL VENOUS BLD VENIPUNCTURE: CPT

## 2020-09-30 PROCEDURE — 85025 COMPLETE CBC W/AUTO DIFF WBC: CPT

## 2020-09-30 PROCEDURE — 87086 URINE CULTURE/COLONY COUNT: CPT

## 2020-09-30 PROCEDURE — 83036 HEMOGLOBIN GLYCOSYLATED A1C: CPT

## 2020-09-30 PROCEDURE — 87147 CULTURE TYPE IMMUNOLOGIC: CPT

## 2020-09-30 PROCEDURE — 80048 BASIC METABOLIC PNL TOTAL CA: CPT

## 2020-09-30 PROCEDURE — 86900 BLOOD TYPING SEROLOGIC ABO: CPT

## 2020-10-01 LAB
ATRIAL RATE: 78 BPM
BACTERIA SPEC CULT: ABNORMAL
CALCULATED P AXIS, ECG09: 24 DEGREES
CALCULATED R AXIS, ECG10: 8 DEGREES
CALCULATED T AXIS, ECG11: 54 DEGREES
CC UR VC: ABNORMAL
DIAGNOSIS, 93000: NORMAL
P-R INTERVAL, ECG05: 156 MS
Q-T INTERVAL, ECG07: 378 MS
QRS DURATION, ECG06: 74 MS
QTC CALCULATION (BEZET), ECG08: 430 MS
SERVICE CMNT-IMP: ABNORMAL
VENTRICULAR RATE, ECG03: 78 BPM

## 2021-01-13 DIAGNOSIS — M17.12 PRIMARY OSTEOARTHRITIS OF LEFT KNEE: Primary | ICD-10-CM

## 2021-01-13 DIAGNOSIS — Z01.812 PRE-OPERATIVE LABORATORY EXAMINATION: ICD-10-CM

## 2021-02-03 ENCOUNTER — HOSPITAL ENCOUNTER (OUTPATIENT)
Dept: PREADMISSION TESTING | Age: 76
Discharge: HOME | End: 2021-02-03
Payer: MEDICARE

## 2021-02-03 DIAGNOSIS — Z01.812 PRE-OPERATIVE LABORATORY EXAMINATION: ICD-10-CM

## 2021-02-03 DIAGNOSIS — M17.12 PRIMARY OSTEOARTHRITIS OF LEFT KNEE: ICD-10-CM

## 2021-02-03 LAB
ABO + RH BLD: NORMAL
ANION GAP SERPL CALC-SCNC: 7 MMOL/L (ref 3–18)
APPEARANCE UR: CLEAR
APTT PPP: 32.4 SEC (ref 23–36.4)
BASOPHILS # BLD: 0 K/UL (ref 0–0.1)
BASOPHILS NFR BLD: 0 % (ref 0–2)
BILIRUB UR QL: NEGATIVE
BLOOD GROUP ANTIBODIES SERPL: NORMAL
BUN SERPL-MCNC: 29 MG/DL (ref 7–18)
BUN/CREAT SERPL: 25 (ref 12–20)
CALCIUM SERPL-MCNC: 8.7 MG/DL (ref 8.5–10.1)
CHLORIDE SERPL-SCNC: 106 MMOL/L (ref 100–111)
CO2 SERPL-SCNC: 28 MMOL/L (ref 21–32)
COLOR UR: YELLOW
CREAT SERPL-MCNC: 1.18 MG/DL (ref 0.6–1.3)
DIFFERENTIAL METHOD BLD: ABNORMAL
EOSINOPHIL # BLD: 0.1 K/UL (ref 0–0.4)
EOSINOPHIL NFR BLD: 2 % (ref 0–5)
ERYTHROCYTE [DISTWIDTH] IN BLOOD BY AUTOMATED COUNT: 12.3 % (ref 11.6–14.5)
GLUCOSE SERPL-MCNC: 131 MG/DL (ref 74–99)
GLUCOSE UR STRIP.AUTO-MCNC: NEGATIVE MG/DL
HCT VFR BLD AUTO: 38.5 % (ref 36–48)
HGB BLD-MCNC: 13.7 G/DL (ref 13–16)
HGB UR QL STRIP: NEGATIVE
INR PPP: 1 (ref 0.8–1.2)
KETONES UR QL STRIP.AUTO: NEGATIVE MG/DL
LEUKOCYTE ESTERASE UR QL STRIP.AUTO: NEGATIVE
LYMPHOCYTES # BLD: 1.6 K/UL (ref 0.9–3.6)
LYMPHOCYTES NFR BLD: 26 % (ref 21–52)
MCH RBC QN AUTO: 30.9 PG (ref 24–34)
MCHC RBC AUTO-ENTMCNC: 35.6 G/DL (ref 31–37)
MCV RBC AUTO: 86.7 FL (ref 74–97)
MONOCYTES # BLD: 0.5 K/UL (ref 0.05–1.2)
MONOCYTES NFR BLD: 8 % (ref 3–10)
NEUTS SEG # BLD: 3.9 K/UL (ref 1.8–8)
NEUTS SEG NFR BLD: 64 % (ref 40–73)
NITRITE UR QL STRIP.AUTO: NEGATIVE
PH UR STRIP: 5 [PH] (ref 5–8)
PLATELET # BLD AUTO: 272 K/UL (ref 135–420)
PMV BLD AUTO: 9.6 FL (ref 9.2–11.8)
POTASSIUM SERPL-SCNC: 4.1 MMOL/L (ref 3.5–5.5)
PROT UR STRIP-MCNC: NEGATIVE MG/DL
PROTHROMBIN TIME: 12.6 SEC (ref 11.5–15.2)
RBC # BLD AUTO: 4.44 M/UL (ref 4.7–5.5)
SODIUM SERPL-SCNC: 141 MMOL/L (ref 136–145)
SP GR UR REFRACTOMETRY: 1.02 (ref 1–1.03)
SPECIMEN EXP DATE BLD: NORMAL
UROBILINOGEN UR QL STRIP.AUTO: 1 EU/DL (ref 0.2–1)
WBC # BLD AUTO: 6.1 K/UL (ref 4.6–13.2)

## 2021-02-03 PROCEDURE — 85610 PROTHROMBIN TIME: CPT

## 2021-02-03 PROCEDURE — 85025 COMPLETE CBC W/AUTO DIFF WBC: CPT

## 2021-02-03 PROCEDURE — 87086 URINE CULTURE/COLONY COUNT: CPT

## 2021-02-03 PROCEDURE — 81003 URINALYSIS AUTO W/O SCOPE: CPT

## 2021-02-03 PROCEDURE — 80048 BASIC METABOLIC PNL TOTAL CA: CPT

## 2021-02-03 PROCEDURE — 36415 COLL VENOUS BLD VENIPUNCTURE: CPT

## 2021-02-03 PROCEDURE — 85730 THROMBOPLASTIN TIME PARTIAL: CPT

## 2021-02-03 PROCEDURE — 86901 BLOOD TYPING SEROLOGIC RH(D): CPT

## 2021-02-04 LAB
BACTERIA SPEC CULT: NORMAL
SERVICE CMNT-IMP: NORMAL

## 2021-02-05 RX ORDER — ATORVASTATIN CALCIUM 40 MG/1
40 TABLET, FILM COATED ORAL DAILY
COMMUNITY

## 2021-02-05 RX ORDER — METFORMIN HYDROCHLORIDE 1000 MG/1
1000 TABLET ORAL 2 TIMES DAILY WITH MEALS
COMMUNITY

## 2021-02-11 ENCOUNTER — HOSPITAL ENCOUNTER (OUTPATIENT)
Dept: PREADMISSION TESTING | Age: 76
Discharge: HOME | End: 2021-02-11
Payer: MEDICARE

## 2021-02-11 ENCOUNTER — OFFICE VISIT (OUTPATIENT)
Dept: ORTHOPEDIC SURGERY | Age: 76
End: 2021-02-11

## 2021-02-11 VITALS
WEIGHT: 177.6 LBS | OXYGEN SATURATION: 97 % | DIASTOLIC BLOOD PRESSURE: 91 MMHG | BODY MASS INDEX: 27.88 KG/M2 | HEIGHT: 67 IN | RESPIRATION RATE: 14 BRPM | TEMPERATURE: 98.9 F | SYSTOLIC BLOOD PRESSURE: 150 MMHG | HEART RATE: 85 BPM

## 2021-02-11 DIAGNOSIS — G89.29 CHRONIC PAIN OF LEFT KNEE: Primary | ICD-10-CM

## 2021-02-11 DIAGNOSIS — Z01.812 PRE-OPERATIVE LABORATORY EXAMINATION: ICD-10-CM

## 2021-02-11 DIAGNOSIS — M17.12 PRIMARY OSTEOARTHRITIS OF LEFT KNEE: ICD-10-CM

## 2021-02-11 DIAGNOSIS — M25.562 CHRONIC PAIN OF LEFT KNEE: Primary | ICD-10-CM

## 2021-02-11 PROCEDURE — U0003 INFECTIOUS AGENT DETECTION BY NUCLEIC ACID (DNA OR RNA); SEVERE ACUTE RESPIRATORY SYNDROME CORONAVIRUS 2 (SARS-COV-2) (CORONAVIRUS DISEASE [COVID-19]), AMPLIFIED PROBE TECHNIQUE, MAKING USE OF HIGH THROUGHPUT TECHNOLOGIES AS DESCRIBED BY CMS-2020-01-R: HCPCS

## 2021-02-11 RX ORDER — PREGABALIN 25 MG/1
75 CAPSULE ORAL
Status: CANCELLED | OUTPATIENT
Start: 2021-02-11 | End: 2021-02-11

## 2021-02-11 RX ORDER — ACETAMINOPHEN 325 MG/1
500 TABLET ORAL
Status: CANCELLED | OUTPATIENT
Start: 2021-02-16 | End: 2021-02-17

## 2021-02-11 RX ORDER — CELECOXIB 100 MG/1
200 CAPSULE ORAL
Status: CANCELLED | OUTPATIENT
Start: 2021-02-11 | End: 2021-02-11

## 2021-02-12 LAB — SARS-COV-2, COV2NT: NOT DETECTED

## 2021-02-15 ENCOUNTER — ANESTHESIA EVENT (OUTPATIENT)
Dept: SURGERY | Age: 76
End: 2021-02-15
Payer: MEDICARE
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
## 2021-02-15 NOTE — H&P
History and Physical    56-year-old obese  male seen in preparation for his left total knee replacement. Review of Systems   Constitutional: Positive for activity change (Decreased range of motion of the left knee secondary to end-stage osteoarthritis). Negative for fatigue. HENT: Negative for ear pain. Eyes: Negative for pain. Respiratory: Negative for shortness of breath. Gastrointestinal: Negative for nausea and vomiting. Genitourinary: Negative for frequency. Musculoskeletal: Positive for arthralgias, gait problem, joint swelling and myalgias. Associated with left knee end-stage osteoarthritis   Neurological: Positive for weakness (Left lower extremity secondary to end-stage osteoarthritis of the left knee). Hematological: Negative. Psychiatric/Behavioral: The patient is not nervous/anxious. Physical Exam  Constitutional:       Appearance: He is well-developed and well-nourished. HENT:      Head: Normocephalic and atraumatic. Eyes:      Pupils: Pupils are equal, round, and reactive to light. Neck:      Musculoskeletal: Normal range of motion. Cardiovascular:      Pulses: Intact distal pulses. Pulmonary:      Breath sounds: Normal breath sounds. Musculoskeletal:      Left knee: He exhibits decreased range of motion, swelling, effusion and bony tenderness. Tenderness found. Medial joint line and lateral joint line tenderness noted. Legs:    Skin:     General: Skin is warm and dry. Capillary Refill: Capillary refill takes less than 2 seconds. Neurological:      Mental Status: He is alert and oriented to person, place, and time. Psychiatric:         Mood and Affect: Mood and affect normal.         Behavior: Behavior normal.         Thought Content:  Thought content normal.         Judgment: Judgment normal.       End-stage osteoarthritis of the left knee    Decreased range of motion of the left knee secondary to above    Plan: Primary left total knee replacement            Orthopaedically, this patient requires admission as predetermined by the attending physicians documented severity of the admitting diagnosis,  and expected need for post surgical and co morbity health management determines length of projected stay. Risk / Benefit: Surgeon will discuss in \"face to face\" encounter on day of surgery. Family History and Surgical History reviewed, discussed in detail, and deemed Non-Contributory in preparation for this surgical encounter. Patient: Mary Jasso                MRN: [id number]       SSN: [id number]  YOB: 1945        AGE: 70 y.o.         SEX: male          PCP: Gracie Salvador DO  02/15/21    Chief Complaint   Patient presents with    Knee Pain     Left       HISTORY:  Mary Jasso is a 70 y.o. male is seen in preparation for his left primary total knee replacement      Pain Assessment  2/11/2021   Location of Pain Knee   Location Modifiers Left   Severity of Pain 8   Quality of Pain Sharp   Duration of Pain Persistent   Frequency of Pain Intermittent   Aggravating Factors Stairs   Aggravating Factors Comment -   Limiting Behavior No   Relieving Factors Nothing   Result of Injury No   Work-Related Injury -   Type of Injury -           Lab Results   Component Value Date/Time    Hemoglobin A1c 12.7 (H) 09/30/2020 08:09 AM     Weight Metrics 2/11/2021 2/5/2021 8/14/2020 6/29/2020 6/22/2020 6/15/2020 4/20/2020   Weight 177 lb 9.6 oz 170 lb 187 lb 195 lb 12.8 oz 198 lb 198 lb 200 lb   BMI 27.82 kg/m2 - 29.29 kg/m2 30.67 kg/m2 31.01 kg/m2 31.01 kg/m2 31.32 kg/m2            Problem List Items Addressed This Visit     None      Visit Diagnoses     Chronic pain of left knee    -  Primary    Primary osteoarthritis of left knee              PAST MEDICAL HISTORY:   Past Medical History:   Diagnosis Date    Diabetes ([address]     GERD (gastroesophageal reflux disease)     Hypertension     Impaired fasting glucose February 2010       PAST SURGICAL HISTORY:   Past Surgical History:   Procedure Laterality Date    ENDOSCOPY, COLON, DIAGNOSTIC  2006    [place] ABDOMEN SURGERY PROC UNLISTED  1968    secondary to injury       ALLERGIES:   Allergies   Allergen Reactions    Omeprazole Hives, Swelling and Angioedema        CURRENT MEDICATIONS:  A list of medications prior to the time of admission include:  Prior to Admission medications    Medication Sig Start Date End Date Taking? Authorizing Provider   metFORMIN (GLUCOPHAGE) 1,000 mg tablet Take 1,000 mg by mouth two (2) times daily (with meals). Yes Provider, Historical   atorvastatin (LIPITOR) 40 mg tablet Take 40 mg by mouth daily. Yes Provider, Historical   hydroCHLOROthiazide (HYDRODIURIL) 25 mg tablet Take 1 Tab by mouth daily. 3/6/20  Yes Venice Garcia MD   pantoprazole (PROTONIX) 40 mg tablet Take 1 Tab by mouth daily. 3/6/20  Yes Venice Garcia MD       FAMILY HISTORY:   Family History   Problem Relation Age of Onset    Stroke Mother     Heart Attack Father        SOCIAL HISTORY:   Social History     Socioeconomic History    Marital status:      Spouse name: Not on file    Number of children: Not on file    Years of education: Not on file    Highest education level: Not on file   Tobacco Use    Smoking status: Never Smoker    Smokeless tobacco: Never Used   Substance and Sexual Activity    Alcohol use: No    Drug use: No       ROS:No CP, No SOB, No fever/chills nor night sweats. No headaches, vision abnormalities to include double and oral loss of vision. No hearing abnormalities. Musculoskeletal pain per HPI. Pain is exacerbated positionally. Pt denies h/o spinal surgery, injections, or PT/chiropractor. Self treated with less than adequate relief on oral antiinflammatories. . Pt denies change in bowel or bladder habits. Pt denies fever, weight loss, or skin changes.     EXAM:  Patient alert and oriented x 3,   CN II-XII grossly intact  Sitting comfortably in the exam room, interacting with conversation with pleasant affect. Breathing appears regular effortless with no visible usage of accessory muscles  Distal cap refill intact at 2/2 Josiah UE / LE. Neuro intact Josiah UE/LE to noxious stimuli    Ortho Specific exam:      See [facility] for history physical review of systems                      IMPRESSION:      ICD-10-CM ICD-9-CM    1. Chronic pain of left knee  M25.562 719.46     G89.29 338.29    2. Primary osteoarthritis of left knee  M17.12 715.16         PLAN: To the operating room for primary left total knee replacement. Patient provided a reminder for a \"due or due soon\" health maintenance. I have asked the patient to schedule an appointment with their primary care provider for follow-up on general health maintenance concerns. Today all the patient's questions were answered to their satisfaction. Copies of x-rays reviewed if obtained this visit, and provided to patient. Dictation disclaimer:  Please note that this dictation was completed with [employer], the computer voice recognition software. Quite often unanticipated grammatical, syntax, homophones, and other interpretive errors are inadvertently transcribed by the computer software. Please disregard these errors. Please excuse any errors that have escaped final proofreading. Bharathi ALMANZAR, APC, MPAS, PA-C  [facility]

## 2021-02-16 ENCOUNTER — ANESTHESIA (OUTPATIENT)
Dept: SURGERY | Age: 76
End: 2021-02-16
Payer: MEDICARE

## 2021-02-16 ENCOUNTER — HOSPITAL ENCOUNTER (OUTPATIENT)
Age: 76
LOS: 1 days | Discharge: HOME HEALTH | End: 2021-02-17
Attending: SPECIALIST | Admitting: SPECIALIST
Payer: MEDICARE

## 2021-02-16 DIAGNOSIS — M17.12 PRIMARY OSTEOARTHRITIS OF LEFT KNEE: ICD-10-CM

## 2021-02-16 DIAGNOSIS — G89.18 ACUTE POST-OPERATIVE PAIN: Primary | ICD-10-CM

## 2021-02-16 LAB
GLUCOSE BLD STRIP.AUTO-MCNC: 109 MG/DL (ref 70–110)
GLUCOSE BLD STRIP.AUTO-MCNC: 185 MG/DL (ref 70–110)

## 2021-02-16 PROCEDURE — 64447 NJX AA&/STRD FEMORAL NRV IMG: CPT | Performed by: ANESTHESIOLOGY

## 2021-02-16 PROCEDURE — 77030016507 HC BIT DRL3 ZIMM -B: Performed by: SPECIALIST

## 2021-02-16 PROCEDURE — 01402 ANES OPN/ARTH TOT KNE ARTHRP: CPT | Performed by: ANESTHESIOLOGY

## 2021-02-16 PROCEDURE — 77030008683 HC TU ET CUF COVD -A: Performed by: ANESTHESIOLOGY

## 2021-02-16 PROCEDURE — C9290 INJ, BUPIVACAINE LIPOSOME: HCPCS | Performed by: SPECIALIST

## 2021-02-16 PROCEDURE — 74011250636 HC RX REV CODE- 250/636: Performed by: NURSE ANESTHETIST, CERTIFIED REGISTERED

## 2021-02-16 PROCEDURE — 74011250636 HC RX REV CODE- 250/636

## 2021-02-16 PROCEDURE — 77030014368: Performed by: SPECIALIST

## 2021-02-16 PROCEDURE — 97116 GAIT TRAINING THERAPY: CPT

## 2021-02-16 PROCEDURE — 74011250637 HC RX REV CODE- 250/637: Performed by: ANESTHESIOLOGY

## 2021-02-16 PROCEDURE — 77030012012 HC AIRWY OP SFT TELE -A: Performed by: ANESTHESIOLOGY

## 2021-02-16 PROCEDURE — 74011250636 HC RX REV CODE- 250/636: Performed by: PHYSICIAN ASSISTANT

## 2021-02-16 PROCEDURE — 97161 PT EVAL LOW COMPLEX 20 MIN: CPT

## 2021-02-16 PROCEDURE — 74011250636 HC RX REV CODE- 250/636: Performed by: SPECIALIST

## 2021-02-16 PROCEDURE — 74011000258 HC RX REV CODE- 258: Performed by: SPECIALIST

## 2021-02-16 PROCEDURE — 77030013079 HC BLNKT BAIR HGGR 3M -A: Performed by: ANESTHESIOLOGY

## 2021-02-16 PROCEDURE — 2709999900 HC NON-CHARGEABLE SUPPLY: Performed by: SPECIALIST

## 2021-02-16 PROCEDURE — 77030013708 HC HNDPC SUC IRR PULS STRY –B: Performed by: SPECIALIST

## 2021-02-16 PROCEDURE — 01402 ANES OPN/ARTH TOT KNE ARTHRP: CPT | Performed by: NURSE ANESTHETIST, CERTIFIED REGISTERED

## 2021-02-16 PROCEDURE — 74011636637 HC RX REV CODE- 636/637: Performed by: NURSE ANESTHETIST, CERTIFIED REGISTERED

## 2021-02-16 PROCEDURE — 77030031139 HC SUT VCRL2 J&J -A: Performed by: SPECIALIST

## 2021-02-16 PROCEDURE — 74011250637 HC RX REV CODE- 250/637: Performed by: PHYSICIAN ASSISTANT

## 2021-02-16 PROCEDURE — 77030006835 HC BLD SAW SAG STRY -B: Performed by: SPECIALIST

## 2021-02-16 PROCEDURE — 76942 ECHO GUIDE FOR BIOPSY: CPT | Performed by: ANESTHESIOLOGY

## 2021-02-16 PROCEDURE — 77030040922 HC BLNKT HYPOTHRM STRY -A: Performed by: SPECIALIST

## 2021-02-16 PROCEDURE — 74011250637 HC RX REV CODE- 250/637: Performed by: SPECIALIST

## 2021-02-16 PROCEDURE — 77030040361 HC SLV COMPR DVT MDII -B: Performed by: SPECIALIST

## 2021-02-16 PROCEDURE — 74011000250 HC RX REV CODE- 250: Performed by: SPECIALIST

## 2021-02-16 PROCEDURE — 27447 TOTAL KNEE ARTHROPLASTY: CPT | Performed by: SPECIALIST

## 2021-02-16 PROCEDURE — 74011000250 HC RX REV CODE- 250: Performed by: ANESTHESIOLOGY

## 2021-02-16 PROCEDURE — 77030006822 HC BLD SAW SAG BRSM -B: Performed by: SPECIALIST

## 2021-02-16 PROCEDURE — 74011250636 HC RX REV CODE- 250/636: Performed by: ANESTHESIOLOGY

## 2021-02-16 PROCEDURE — 82962 GLUCOSE BLOOD TEST: CPT

## 2021-02-16 PROCEDURE — C1776 JOINT DEVICE (IMPLANTABLE): HCPCS | Performed by: SPECIALIST

## 2021-02-16 PROCEDURE — 99100 ANES PT EXTEME AGE<1 YR&>70: CPT | Performed by: ANESTHESIOLOGY

## 2021-02-16 PROCEDURE — 64450 NJX AA&/STRD OTHER PN/BRANCH: CPT | Performed by: ANESTHESIOLOGY

## 2021-02-16 PROCEDURE — 77030003666 HC NDL SPINAL BD -A: Performed by: SPECIALIST

## 2021-02-16 PROCEDURE — 64415 NJX AA&/STRD BRCH PLXS IMG: CPT | Performed by: ANESTHESIOLOGY

## 2021-02-16 PROCEDURE — 77030019605: Performed by: SPECIALIST

## 2021-02-16 PROCEDURE — 74011250637 HC RX REV CODE- 250/637: Performed by: NURSE ANESTHETIST, CERTIFIED REGISTERED

## 2021-02-16 PROCEDURE — 77030008462 HC STPLR SKN PROX J&J -A: Performed by: SPECIALIST

## 2021-02-16 PROCEDURE — 76010000172 HC OR TIME 2.5 TO 3 HR INTENSV-TIER 1: Performed by: SPECIALIST

## 2021-02-16 PROCEDURE — 76210000016 HC OR PH I REC 1 TO 1.5 HR: Performed by: SPECIALIST

## 2021-02-16 PROCEDURE — 77030003029 HC SUT VCRL J&J -B: Performed by: SPECIALIST

## 2021-02-16 PROCEDURE — 76060000036 HC ANESTHESIA 2.5 TO 3 HR: Performed by: SPECIALIST

## 2021-02-16 PROCEDURE — C1713 ANCHOR/SCREW BN/BN,TIS/BN: HCPCS | Performed by: SPECIALIST

## 2021-02-16 PROCEDURE — 99100 ANES PT EXTEME AGE<1 YR&>70: CPT | Performed by: NURSE ANESTHETIST, CERTIFIED REGISTERED

## 2021-02-16 DEVICE — CEMENT BNE W/GENTAMICIN R1X40 --: Type: IMPLANTABLE DEVICE | Site: KNEE | Status: FUNCTIONAL

## 2021-02-16 DEVICE — COMPONENT PAT DIA37MM THK8.6MM THN KNEE POLY 3 PEG SER A: Type: IMPLANTABLE DEVICE | Site: KNEE | Status: FUNCTIONAL

## 2021-02-16 DEVICE — IMPLANTABLE DEVICE: Type: IMPLANTABLE DEVICE | Site: KNEE | Status: FUNCTIONAL

## 2021-02-16 DEVICE — STEM TIB L80MM DIA12.5MM KNEE PRI FIN VANGUARD COMPLT SYS: Type: IMPLANTABLE DEVICE | Site: KNEE | Status: FUNCTIONAL

## 2021-02-16 DEVICE — KNEE K1 TOT HEMI STD CEM IMPL CAPPED K1 ZIM: Type: IMPLANTABLE DEVICE | Site: KNEE | Status: FUNCTIONAL

## 2021-02-16 DEVICE — TRAY TIB L83MM KNEE COPOLYESTER SIL INTLOK PRI CEM VANGUARD: Type: IMPLANTABLE DEVICE | Site: KNEE | Status: FUNCTIONAL

## 2021-02-16 RX ORDER — ACETAMINOPHEN 500 MG
500 TABLET ORAL
Status: COMPLETED | OUTPATIENT
Start: 2021-02-16 | End: 2021-02-16

## 2021-02-16 RX ORDER — OXYCODONE HYDROCHLORIDE 5 MG/1
10 TABLET ORAL
Status: DISCONTINUED | OUTPATIENT
Start: 2021-02-16 | End: 2021-02-17 | Stop reason: HOSPADM

## 2021-02-16 RX ORDER — SODIUM CHLORIDE 0.9 % (FLUSH) 0.9 %
5-40 SYRINGE (ML) INJECTION AS NEEDED
Status: DISCONTINUED | OUTPATIENT
Start: 2021-02-16 | End: 2021-02-17 | Stop reason: HOSPADM

## 2021-02-16 RX ORDER — SODIUM CHLORIDE, SODIUM LACTATE, POTASSIUM CHLORIDE, CALCIUM CHLORIDE 600; 310; 30; 20 MG/100ML; MG/100ML; MG/100ML; MG/100ML
75 INJECTION, SOLUTION INTRAVENOUS CONTINUOUS
Status: DISCONTINUED | OUTPATIENT
Start: 2021-02-16 | End: 2021-02-16 | Stop reason: HOSPADM

## 2021-02-16 RX ORDER — KETAMINE HCL 50MG/ML(1)
SYRINGE (ML) INTRAVENOUS AS NEEDED
Status: DISCONTINUED | OUTPATIENT
Start: 2021-02-16 | End: 2021-02-16 | Stop reason: HOSPADM

## 2021-02-16 RX ORDER — ROPIVACAINE HYDROCHLORIDE 2 MG/ML
30 INJECTION, SOLUTION EPIDURAL; INFILTRATION; PERINEURAL
Status: COMPLETED | OUTPATIENT
Start: 2021-02-16 | End: 2021-02-16

## 2021-02-16 RX ORDER — FAMOTIDINE 20 MG/1
20 TABLET, FILM COATED ORAL ONCE
Status: COMPLETED | OUTPATIENT
Start: 2021-02-16 | End: 2021-02-16

## 2021-02-16 RX ORDER — SODIUM CHLORIDE 0.9 % (FLUSH) 0.9 %
5-40 SYRINGE (ML) INJECTION AS NEEDED
Status: DISCONTINUED | OUTPATIENT
Start: 2021-02-16 | End: 2021-02-16 | Stop reason: HOSPADM

## 2021-02-16 RX ORDER — SODIUM CHLORIDE, SODIUM LACTATE, POTASSIUM CHLORIDE, CALCIUM CHLORIDE 600; 310; 30; 20 MG/100ML; MG/100ML; MG/100ML; MG/100ML
50 INJECTION, SOLUTION INTRAVENOUS CONTINUOUS
Status: DISCONTINUED | OUTPATIENT
Start: 2021-02-16 | End: 2021-02-17 | Stop reason: HOSPADM

## 2021-02-16 RX ORDER — ONDANSETRON 2 MG/ML
4 INJECTION INTRAMUSCULAR; INTRAVENOUS ONCE
Status: DISCONTINUED | OUTPATIENT
Start: 2021-02-16 | End: 2021-02-16 | Stop reason: HOSPADM

## 2021-02-16 RX ORDER — DEXTROSE 50 % IN WATER (D50W) INTRAVENOUS SYRINGE
25-50 AS NEEDED
Status: DISCONTINUED | OUTPATIENT
Start: 2021-02-16 | End: 2021-02-16 | Stop reason: HOSPADM

## 2021-02-16 RX ORDER — CELECOXIB 100 MG/1
200 CAPSULE ORAL
Status: COMPLETED | OUTPATIENT
Start: 2021-02-16 | End: 2021-02-16

## 2021-02-16 RX ORDER — MAGNESIUM SULFATE 100 %
4 CRYSTALS MISCELLANEOUS AS NEEDED
Status: DISCONTINUED | OUTPATIENT
Start: 2021-02-16 | End: 2021-02-16 | Stop reason: HOSPADM

## 2021-02-16 RX ORDER — SODIUM CHLORIDE, SODIUM LACTATE, POTASSIUM CHLORIDE, CALCIUM CHLORIDE 600; 310; 30; 20 MG/100ML; MG/100ML; MG/100ML; MG/100ML
25 INJECTION, SOLUTION INTRAVENOUS CONTINUOUS
Status: DISCONTINUED | OUTPATIENT
Start: 2021-02-16 | End: 2021-02-16 | Stop reason: HOSPADM

## 2021-02-16 RX ORDER — ONDANSETRON 2 MG/ML
4 INJECTION INTRAMUSCULAR; INTRAVENOUS
Status: COMPLETED | OUTPATIENT
Start: 2021-02-16 | End: 2021-02-16

## 2021-02-16 RX ORDER — NALOXONE HYDROCHLORIDE 0.4 MG/ML
0.2 INJECTION, SOLUTION INTRAMUSCULAR; INTRAVENOUS; SUBCUTANEOUS AS NEEDED
Status: DISCONTINUED | OUTPATIENT
Start: 2021-02-16 | End: 2021-02-16 | Stop reason: HOSPADM

## 2021-02-16 RX ORDER — PROPOFOL 10 MG/ML
INJECTION, EMULSION INTRAVENOUS AS NEEDED
Status: DISCONTINUED | OUTPATIENT
Start: 2021-02-16 | End: 2021-02-16 | Stop reason: HOSPADM

## 2021-02-16 RX ORDER — OXYCODONE HYDROCHLORIDE 5 MG/1
5 TABLET ORAL
Status: DISCONTINUED | OUTPATIENT
Start: 2021-02-16 | End: 2021-02-17 | Stop reason: HOSPADM

## 2021-02-16 RX ORDER — CEFAZOLIN SODIUM 2 G/50ML
2 SOLUTION INTRAVENOUS EVERY 8 HOURS
Status: COMPLETED | OUTPATIENT
Start: 2021-02-16 | End: 2021-02-17

## 2021-02-16 RX ORDER — NALOXONE HYDROCHLORIDE 0.4 MG/ML
0.4 INJECTION, SOLUTION INTRAMUSCULAR; INTRAVENOUS; SUBCUTANEOUS AS NEEDED
Status: DISCONTINUED | OUTPATIENT
Start: 2021-02-16 | End: 2021-02-17 | Stop reason: HOSPADM

## 2021-02-16 RX ORDER — ONDANSETRON 2 MG/ML
INJECTION INTRAMUSCULAR; INTRAVENOUS
Status: COMPLETED
Start: 2021-02-16 | End: 2021-02-16

## 2021-02-16 RX ORDER — SUCCINYLCHOLINE CHLORIDE 20 MG/ML
INJECTION INTRAMUSCULAR; INTRAVENOUS AS NEEDED
Status: DISCONTINUED | OUTPATIENT
Start: 2021-02-16 | End: 2021-02-16 | Stop reason: HOSPADM

## 2021-02-16 RX ORDER — ONDANSETRON 2 MG/ML
4 INJECTION INTRAMUSCULAR; INTRAVENOUS
Status: DISCONTINUED | OUTPATIENT
Start: 2021-02-16 | End: 2021-02-17 | Stop reason: HOSPADM

## 2021-02-16 RX ORDER — SODIUM CHLORIDE 0.9 % (FLUSH) 0.9 %
5-40 SYRINGE (ML) INJECTION EVERY 8 HOURS
Status: DISCONTINUED | OUTPATIENT
Start: 2021-02-16 | End: 2021-02-16 | Stop reason: HOSPADM

## 2021-02-16 RX ORDER — PREGABALIN 75 MG/1
75 CAPSULE ORAL
Status: DISCONTINUED | OUTPATIENT
Start: 2021-02-16 | End: 2021-02-16

## 2021-02-16 RX ORDER — FLUMAZENIL 0.1 MG/ML
0.2 INJECTION INTRAVENOUS
Status: DISCONTINUED | OUTPATIENT
Start: 2021-02-16 | End: 2021-02-16 | Stop reason: HOSPADM

## 2021-02-16 RX ORDER — ROCURONIUM BROMIDE 10 MG/ML
INJECTION, SOLUTION INTRAVENOUS AS NEEDED
Status: DISCONTINUED | OUTPATIENT
Start: 2021-02-16 | End: 2021-02-16 | Stop reason: HOSPADM

## 2021-02-16 RX ORDER — HYDROCODONE BITARTRATE AND ACETAMINOPHEN 5; 325 MG/1; MG/1
1 TABLET ORAL AS NEEDED
Status: DISCONTINUED | OUTPATIENT
Start: 2021-02-16 | End: 2021-02-16 | Stop reason: HOSPADM

## 2021-02-16 RX ORDER — INSULIN LISPRO 100 [IU]/ML
INJECTION, SOLUTION INTRAVENOUS; SUBCUTANEOUS ONCE
Status: COMPLETED | OUTPATIENT
Start: 2021-02-16 | End: 2021-02-16

## 2021-02-16 RX ORDER — ATORVASTATIN CALCIUM 40 MG/1
40 TABLET, FILM COATED ORAL DAILY
Status: DISCONTINUED | OUTPATIENT
Start: 2021-02-17 | End: 2021-02-17 | Stop reason: HOSPADM

## 2021-02-16 RX ORDER — FENTANYL CITRATE 50 UG/ML
INJECTION, SOLUTION INTRAMUSCULAR; INTRAVENOUS AS NEEDED
Status: DISCONTINUED | OUTPATIENT
Start: 2021-02-16 | End: 2021-02-16 | Stop reason: HOSPADM

## 2021-02-16 RX ORDER — MIDAZOLAM HYDROCHLORIDE 1 MG/ML
2 INJECTION, SOLUTION INTRAMUSCULAR; INTRAVENOUS ONCE
Status: COMPLETED | OUTPATIENT
Start: 2021-02-16 | End: 2021-02-16

## 2021-02-16 RX ORDER — DIPHENHYDRAMINE HYDROCHLORIDE 50 MG/ML
12.5 INJECTION, SOLUTION INTRAMUSCULAR; INTRAVENOUS
Status: DISCONTINUED | OUTPATIENT
Start: 2021-02-16 | End: 2021-02-16 | Stop reason: HOSPADM

## 2021-02-16 RX ORDER — ROPIVACAINE HYDROCHLORIDE 2 MG/ML
INJECTION, SOLUTION EPIDURAL; INFILTRATION; PERINEURAL
Status: COMPLETED | OUTPATIENT
Start: 2021-02-16 | End: 2021-02-16

## 2021-02-16 RX ORDER — HYDROCHLOROTHIAZIDE 25 MG/1
25 TABLET ORAL DAILY
Status: DISCONTINUED | OUTPATIENT
Start: 2021-02-17 | End: 2021-02-17 | Stop reason: HOSPADM

## 2021-02-16 RX ORDER — ACETAMINOPHEN 325 MG/1
650 TABLET ORAL EVERY 6 HOURS
Status: DISCONTINUED | OUTPATIENT
Start: 2021-02-16 | End: 2021-02-17 | Stop reason: HOSPADM

## 2021-02-16 RX ORDER — HYDROMORPHONE HYDROCHLORIDE 2 MG/ML
0.5 INJECTION, SOLUTION INTRAMUSCULAR; INTRAVENOUS; SUBCUTANEOUS
Status: COMPLETED | OUTPATIENT
Start: 2021-02-16 | End: 2021-02-16

## 2021-02-16 RX ORDER — FENTANYL CITRATE 50 UG/ML
100 INJECTION, SOLUTION INTRAMUSCULAR; INTRAVENOUS ONCE
Status: COMPLETED | OUTPATIENT
Start: 2021-02-16 | End: 2021-02-16

## 2021-02-16 RX ORDER — ZOLPIDEM TARTRATE 5 MG/1
5 TABLET ORAL
Status: DISCONTINUED | OUTPATIENT
Start: 2021-02-16 | End: 2021-02-17 | Stop reason: HOSPADM

## 2021-02-16 RX ORDER — PREGABALIN 50 MG/1
50 CAPSULE ORAL
Status: COMPLETED | OUTPATIENT
Start: 2021-02-16 | End: 2021-02-16

## 2021-02-16 RX ORDER — METFORMIN HYDROCHLORIDE 500 MG/1
1000 TABLET ORAL 2 TIMES DAILY WITH MEALS
Status: DISCONTINUED | OUTPATIENT
Start: 2021-02-16 | End: 2021-02-17 | Stop reason: HOSPADM

## 2021-02-16 RX ORDER — NALBUPHINE HYDROCHLORIDE 10 MG/ML
5 INJECTION, SOLUTION INTRAMUSCULAR; INTRAVENOUS; SUBCUTANEOUS
Status: DISCONTINUED | OUTPATIENT
Start: 2021-02-16 | End: 2021-02-16 | Stop reason: HOSPADM

## 2021-02-16 RX ORDER — INSULIN LISPRO 100 [IU]/ML
INJECTION, SOLUTION INTRAVENOUS; SUBCUTANEOUS AS NEEDED
Status: DISCONTINUED | OUTPATIENT
Start: 2021-02-16 | End: 2021-02-16 | Stop reason: HOSPADM

## 2021-02-16 RX ORDER — FENTANYL CITRATE 50 UG/ML
INJECTION, SOLUTION INTRAMUSCULAR; INTRAVENOUS
Status: COMPLETED | OUTPATIENT
Start: 2021-02-16 | End: 2021-02-16

## 2021-02-16 RX ORDER — PANTOPRAZOLE SODIUM 40 MG/1
40 TABLET, DELAYED RELEASE ORAL DAILY
Status: DISCONTINUED | OUTPATIENT
Start: 2021-02-17 | End: 2021-02-17 | Stop reason: HOSPADM

## 2021-02-16 RX ORDER — MIDAZOLAM HYDROCHLORIDE 1 MG/ML
INJECTION, SOLUTION INTRAMUSCULAR; INTRAVENOUS
Status: COMPLETED | OUTPATIENT
Start: 2021-02-16 | End: 2021-02-16

## 2021-02-16 RX ORDER — HYDROMORPHONE HYDROCHLORIDE 2 MG/ML
0.2 INJECTION, SOLUTION INTRAMUSCULAR; INTRAVENOUS; SUBCUTANEOUS AS NEEDED
Status: DISCONTINUED | OUTPATIENT
Start: 2021-02-16 | End: 2021-02-16 | Stop reason: HOSPADM

## 2021-02-16 RX ORDER — SODIUM CHLORIDE 0.9 % (FLUSH) 0.9 %
5-40 SYRINGE (ML) INJECTION EVERY 8 HOURS
Status: DISCONTINUED | OUTPATIENT
Start: 2021-02-16 | End: 2021-02-17 | Stop reason: HOSPADM

## 2021-02-16 RX ORDER — CEFAZOLIN SODIUM 2 G/50ML
2 SOLUTION INTRAVENOUS
Status: COMPLETED | OUTPATIENT
Start: 2021-02-16 | End: 2021-02-16

## 2021-02-16 RX ORDER — HYDROMORPHONE HYDROCHLORIDE 1 MG/ML
1 INJECTION, SOLUTION INTRAMUSCULAR; INTRAVENOUS; SUBCUTANEOUS
Status: DISCONTINUED | OUTPATIENT
Start: 2021-02-16 | End: 2021-02-17 | Stop reason: HOSPADM

## 2021-02-16 RX ORDER — PHENYLEPHRINE HCL IN 0.9% NACL 1 MG/10 ML
SYRINGE (ML) INTRAVENOUS AS NEEDED
Status: DISCONTINUED | OUTPATIENT
Start: 2021-02-16 | End: 2021-02-16 | Stop reason: HOSPADM

## 2021-02-16 RX ORDER — INSULIN LISPRO 100 [IU]/ML
INJECTION, SOLUTION INTRAVENOUS; SUBCUTANEOUS ONCE
Status: DISCONTINUED | OUTPATIENT
Start: 2021-02-16 | End: 2021-02-16 | Stop reason: HOSPADM

## 2021-02-16 RX ORDER — CEFAZOLIN SODIUM 1 G/3ML
INJECTION, POWDER, FOR SOLUTION INTRAMUSCULAR; INTRAVENOUS AS NEEDED
Status: DISCONTINUED | OUTPATIENT
Start: 2021-02-16 | End: 2021-02-16 | Stop reason: HOSPADM

## 2021-02-16 RX ORDER — ONDANSETRON 2 MG/ML
INJECTION INTRAMUSCULAR; INTRAVENOUS AS NEEDED
Status: DISCONTINUED | OUTPATIENT
Start: 2021-02-16 | End: 2021-02-16 | Stop reason: HOSPADM

## 2021-02-16 RX ORDER — LIDOCAINE HYDROCHLORIDE 20 MG/ML
INJECTION, SOLUTION EPIDURAL; INFILTRATION; INTRACAUDAL; PERINEURAL AS NEEDED
Status: DISCONTINUED | OUTPATIENT
Start: 2021-02-16 | End: 2021-02-16 | Stop reason: HOSPADM

## 2021-02-16 RX ORDER — AMOXICILLIN 250 MG
1 CAPSULE ORAL 2 TIMES DAILY
Status: DISCONTINUED | OUTPATIENT
Start: 2021-02-16 | End: 2021-02-17 | Stop reason: HOSPADM

## 2021-02-16 RX ORDER — LIDOCAINE HYDROCHLORIDE 10 MG/ML
0.1 INJECTION, SOLUTION EPIDURAL; INFILTRATION; INTRACAUDAL; PERINEURAL AS NEEDED
Status: DISCONTINUED | OUTPATIENT
Start: 2021-02-16 | End: 2021-02-16 | Stop reason: HOSPADM

## 2021-02-16 RX ORDER — HYDROMORPHONE HYDROCHLORIDE 2 MG/ML
0.5 INJECTION, SOLUTION INTRAMUSCULAR; INTRAVENOUS; SUBCUTANEOUS
Status: DISCONTINUED | OUTPATIENT
Start: 2021-02-16 | End: 2021-02-16 | Stop reason: HOSPADM

## 2021-02-16 RX ADMIN — FENTANYL CITRATE 50 MCG: 50 INJECTION, SOLUTION INTRAMUSCULAR; INTRAVENOUS at 10:48

## 2021-02-16 RX ADMIN — ONDANSETRON 4 MG: 2 INJECTION INTRAMUSCULAR; INTRAVENOUS at 14:00

## 2021-02-16 RX ADMIN — Medication 10 ML: at 21:16

## 2021-02-16 RX ADMIN — ACETAMINOPHEN 500 MG: 500 TABLET ORAL at 09:58

## 2021-02-16 RX ADMIN — DOCUSATE SODIUM 50MG AND SENNOSIDES 8.6MG 1 TABLET: 8.6; 5 TABLET, FILM COATED ORAL at 17:46

## 2021-02-16 RX ADMIN — VANCOMYCIN HYDROCHLORIDE 1000 MG: 1 INJECTION, POWDER, LYOPHILIZED, FOR SOLUTION INTRAVENOUS at 09:48

## 2021-02-16 RX ADMIN — FAMOTIDINE 20 MG: 20 TABLET ORAL at 09:58

## 2021-02-16 RX ADMIN — SODIUM CHLORIDE, SODIUM LACTATE, POTASSIUM CHLORIDE, AND CALCIUM CHLORIDE 50 ML/HR: 600; 310; 30; 20 INJECTION, SOLUTION INTRAVENOUS at 15:00

## 2021-02-16 RX ADMIN — Medication 25 MG: at 12:26

## 2021-02-16 RX ADMIN — PREGABALIN 50 MG: 50 CAPSULE ORAL at 09:58

## 2021-02-16 RX ADMIN — HYDROMORPHONE HYDROCHLORIDE 0.5 MG: 2 INJECTION INTRAMUSCULAR; INTRAVENOUS; SUBCUTANEOUS at 13:48

## 2021-02-16 RX ADMIN — CELECOXIB 200 MG: 100 CAPSULE ORAL at 09:58

## 2021-02-16 RX ADMIN — FENTANYL CITRATE 50 MCG: 50 INJECTION, SOLUTION INTRAMUSCULAR; INTRAVENOUS at 12:59

## 2021-02-16 RX ADMIN — INSULIN LISPRO 3 UNITS: 100 INJECTION, SOLUTION INTRAVENOUS; SUBCUTANEOUS at 14:00

## 2021-02-16 RX ADMIN — TRANEXAMIC ACID 1 G: 100 INJECTION, SOLUTION INTRAVENOUS at 11:00

## 2021-02-16 RX ADMIN — PROPOFOL 200 MG: 10 INJECTION, EMULSION INTRAVENOUS at 10:48

## 2021-02-16 RX ADMIN — FENTANYL CITRATE 100 MCG: 50 INJECTION, SOLUTION INTRAMUSCULAR; INTRAVENOUS at 10:19

## 2021-02-16 RX ADMIN — HYDROMORPHONE HYDROCHLORIDE 0.5 MG: 2 INJECTION INTRAMUSCULAR; INTRAVENOUS; SUBCUTANEOUS at 14:05

## 2021-02-16 RX ADMIN — CEFAZOLIN SODIUM 2 G: 2 SOLUTION INTRAVENOUS at 10:51

## 2021-02-16 RX ADMIN — ROPIVACAINE HYDROCHLORIDE 60 MG: 2 INJECTION, SOLUTION EPIDURAL; INFILTRATION at 10:19

## 2021-02-16 RX ADMIN — HYDROMORPHONE HYDROCHLORIDE 0.5 MG: 2 INJECTION INTRAMUSCULAR; INTRAVENOUS; SUBCUTANEOUS at 13:38

## 2021-02-16 RX ADMIN — FENTANYL CITRATE 50 MCG: 50 INJECTION, SOLUTION INTRAMUSCULAR; INTRAVENOUS at 12:55

## 2021-02-16 RX ADMIN — ROCURONIUM BROMIDE 5 MG: 50 INJECTION INTRAVENOUS at 10:48

## 2021-02-16 RX ADMIN — ONDANSETRON 4 MG: 2 INJECTION INTRAMUSCULAR; INTRAVENOUS at 12:49

## 2021-02-16 RX ADMIN — TRANEXAMIC ACID 1 G: 100 INJECTION, SOLUTION INTRAVENOUS at 12:41

## 2021-02-16 RX ADMIN — ONDANSETRON 4 MG: 2 INJECTION INTRAMUSCULAR; INTRAVENOUS at 16:05

## 2021-02-16 RX ADMIN — SUCCINYLCHOLINE CHLORIDE 120 MG: 20 INJECTION, SOLUTION INTRAMUSCULAR; INTRAVENOUS at 10:48

## 2021-02-16 RX ADMIN — HYDROMORPHONE HYDROCHLORIDE 0.5 MG: 2 INJECTION INTRAMUSCULAR; INTRAVENOUS; SUBCUTANEOUS at 13:43

## 2021-02-16 RX ADMIN — ROPIVACAINE HYDROCHLORIDE 60 MG: 2 INJECTION, SOLUTION EPIDURAL; INFILTRATION at 10:18

## 2021-02-16 RX ADMIN — CEFAZOLIN SODIUM 2 G: 2 SOLUTION INTRAVENOUS at 18:30

## 2021-02-16 RX ADMIN — ACETAMINOPHEN 650 MG: 325 TABLET ORAL at 17:45

## 2021-02-16 RX ADMIN — SODIUM CHLORIDE, SODIUM LACTATE, POTASSIUM CHLORIDE, AND CALCIUM CHLORIDE 25 ML/HR: 600; 310; 30; 20 INJECTION, SOLUTION INTRAVENOUS at 09:48

## 2021-02-16 RX ADMIN — ROCURONIUM BROMIDE 15 MG: 50 INJECTION INTRAVENOUS at 11:00

## 2021-02-16 RX ADMIN — FENTANYL CITRATE 100 MCG: 50 INJECTION, SOLUTION INTRAMUSCULAR; INTRAVENOUS at 10:15

## 2021-02-16 RX ADMIN — MIDAZOLAM 2 MG: 1 INJECTION INTRAMUSCULAR; INTRAVENOUS at 10:15

## 2021-02-16 RX ADMIN — Medication 100 MCG: at 11:10

## 2021-02-16 RX ADMIN — LIDOCAINE HYDROCHLORIDE 60 MG: 20 INJECTION, SOLUTION EPIDURAL; INFILTRATION; INTRACAUDAL; PERINEURAL at 10:48

## 2021-02-16 RX ADMIN — MIDAZOLAM HYDROCHLORIDE 2 MG: 2 INJECTION, SOLUTION INTRAMUSCULAR; INTRAVENOUS at 10:19

## 2021-02-16 RX ADMIN — METFORMIN HYDROCHLORIDE 1000 MG: 500 TABLET ORAL at 17:47

## 2021-02-16 RX ADMIN — FENTANYL CITRATE 50 MCG: 50 INJECTION, SOLUTION INTRAMUSCULAR; INTRAVENOUS at 11:25

## 2021-02-16 RX ADMIN — ROCURONIUM BROMIDE 10 MG: 50 INJECTION INTRAVENOUS at 11:50

## 2021-02-16 RX ADMIN — OXYCODONE HYDROCHLORIDE 5 MG: 5 TABLET ORAL at 17:46

## 2021-02-16 RX ADMIN — Medication 25 MG: at 10:48

## 2021-02-16 NOTE — PROGRESS NOTES
Problem: Patient Education: Go to Patient Education Activity  Goal: Patient/Family Education  2/16/2021 1726 by Bud Cortes  Outcome: Progressing Towards Goal  2/16/2021 1439 by Bud Cortes  Outcome: Progressing Towards Goal     Problem: Knee Replacement: Day of Surgery/Unit  Goal: Off Pathway (Use only if patient is Off Pathway)  2/16/2021 1726 by Bud Cortes  Outcome: Progressing Towards Goal  2/16/2021 1439 by Bud Cortes  Outcome: Progressing Towards Goal  Goal: Activity/Safety  2/16/2021 1726 by Bud Cortes  Outcome: Progressing Towards Goal  2/16/2021 1439 by Bud Cortes  Outcome: Progressing Towards Goal  Goal: Consults, if ordered  2/16/2021 1726 by Bud Cortes  Outcome: Progressing Towards Goal  2/16/2021 1439 by Bud Cortes  Outcome: Progressing Towards Goal  Goal: Diagnostic Test/Procedures  2/16/2021 1726 by Bud Cortes  Outcome: Progressing Towards Goal  2/16/2021 1439 by Bud Cortes  Outcome: Progressing Towards Goal  Goal: Nutrition/Diet  2/16/2021 1726 by Bud Cortes  Outcome: Progressing Towards Goal  2/16/2021 1439 by Bud Cortes  Outcome: Progressing Towards Goal  Goal: Medications  2/16/2021 1726 by Bud Cortes  Outcome: Progressing Towards Goal  2/16/2021 1439 by Bud Cortes  Outcome: Progressing Towards Goal  Goal: Respiratory  2/16/2021 1726 by Bud Cortes  Outcome: Progressing Towards Goal  2/16/2021 1439 by Bud Cortes  Outcome: Progressing Towards Goal  Goal: Treatments/Interventions/Procedures  2/16/2021 1726 by Bud Cortes  Outcome: Progressing Towards Goal  2/16/2021 1439 by Bud Cortes  Outcome: Progressing Towards Goal  Goal: Psychosocial  2/16/2021 1726 by Bud Cortes  Outcome: Progressing Towards Goal  2/16/2021 1439 by Bud Cortes  Outcome: Progressing Towards Goal  Goal: *Initiate mobility  2/16/2021 1726 by Bud Cortes  Outcome: Progressing Towards Goal  2/16/2021 1439 by [employer] [facility]  Outcome: Progressing Towards Goal  Goal: *Optimal pain control at patient's stated goal  2/16/2021 1726 by [employer] [facility]  Outcome: Progressing Towards Goal  2/16/2021 1439 by [employer] [facility]  Outcome: Progressing Towards Goal  Goal: *Hemodynamically stable  2/16/2021 1726 by [employer] [facility]  Outcome: Progressing Towards Goal  2/16/2021 1439 by [employer] [facility]  Outcome: Progressing Towards Goal     Problem: Knee Replacement: Post-Op Day 1  Goal: Off Pathway (Use only if patient is Off Pathway)  2/16/2021 1726 by [employer] [facility]  Outcome: Progressing Towards Goal  2/16/2021 1439 by [employer] [facility]  Outcome: Progressing Towards Goal  Goal: Activity/Safety  2/16/2021 1726 by [employer]  Outcome: Progressing Towards Goal  2/16/2021 1439 by [employer] [facility]  Outcome: Progressing Towards Goal  Goal: Diagnostic Test/Procedures  2/16/2021 1726 by [employer]  Outcome: Progressing Towards Goal  2/16/2021 1439 by [employer] [facility]  Outcome: Progressing Towards Goal  Goal: Nutrition/Diet  2/16/2021 1726 by [employer] [facility]  Outcome: Progressing Towards Goal  2/16/2021 1439 by [employer] [facility]  Outcome: Progressing Towards Goal  Goal: Medications  2/16/2021 1726 by [employer] [facility]  Outcome: Progressing Towards Goal  2/16/2021 1439 by [employer] [facility]  Outcome: Progressing Towards Goal  Goal: Respiratory  2/16/2021 1726 by [employer] [facility]  Outcome: Progressing Towards Goal  2/16/2021 1439 by [employer] [facility]  Outcome: Progressing Towards Goal  Goal: Treatments/Interventions/Procedures  2/16/2021 1726 by [employer]  Outcome: Progressing Towards Goal  2/16/2021 1439 by [employer] [facility]  Outcome: Progressing Towards Goal  Goal: Psychosocial  2/16/2021 1726 by [employer]  Outcome: Progressing Towards Goal  2/16/2021 1439 by [employer] [facility]  Outcome: Progressing Towards Goal  Goal: Discharge Planning  2/16/2021 1726 by [employer] [facility]  Outcome: Progressing Towards Goal  2/16/2021 1439 by Laisha Finical  Outcome: Progressing Towards Goal  Goal: *Demonstrates progressive activity  2/16/2021 1726 by Laisha Finical  Outcome: Progressing Towards Goal  2/16/2021 1439 by Laisha Finical  Outcome: Progressing Towards Goal  Goal: *Optimal pain control at patient's stated goal  2/16/2021 1726 by Laisha Finical  Outcome: Progressing Towards Goal  2/16/2021 1439 by Laisha Finical  Outcome: Progressing Towards Goal  Goal: *Hemodynamically stable  2/16/2021 1726 by Laisha Finical  Outcome: Progressing Towards Goal  2/16/2021 1439 by Laisha Finical  Outcome: Progressing Towards Goal  Goal: *Discharge plan identified  2/16/2021 1726 by Laisha Hall  Outcome: Progressing Towards Goal  2/16/2021 1439 by Laisha Finical  Outcome: Progressing Towards Goal     Problem: Knee Replacement: Post-Op Day 2  Goal: Off Pathway (Use only if patient is Off Pathway)  2/16/2021 1726 by Laisha Hall  Outcome: Progressing Towards Goal  2/16/2021 1439 by Laisha Finical  Outcome: Progressing Towards Goal  Goal: Activity/Safety  2/16/2021 1726 by Laisha Finical  Outcome: Progressing Towards Goal  2/16/2021 1439 by Laishaaixa Hall  Outcome: Progressing Towards Goal  Goal: Diagnostic Test/Procedures  2/16/2021 1726 by Laisha Finical  Outcome: Progressing Towards Goal  2/16/2021 1439 by Laisha Finical  Outcome: Progressing Towards Goal  Goal: Medications  2/16/2021 1726 by Laisha Finical  Outcome: Progressing Towards Goal  2/16/2021 1439 by Laisha Finical  Outcome: Progressing Towards Goal  Goal: Respiratory  2/16/2021 1726 by Laisha Finical  Outcome: Progressing Towards Goal  2/16/2021 1439 by Laisha Finical  Outcome: Progressing Towards Goal  Goal: Treatments/Interventions/Procedures  2/16/2021 1726 by Laisha Hall  Outcome: Progressing Towards Goal  2/16/2021 1439 by Laisha Finical  Outcome: Progressing Towards Goal  Goal: Psychosocial  2/16/2021 1726 by Laisha Hall  Outcome: Progressing Towards Goal  2/16/2021 1439 by Tom Pisano  Outcome: Progressing Towards Goal  Goal: *Met physical therapy criteria for discharge to the next level of care  2/16/2021 1726 by Tom Pisano  Outcome: Progressing Towards Goal  2/16/2021 1439 by Tom Pisano  Outcome: Progressing Towards Goal  Goal: *Optimal pain control with oral analgesia  2/16/2021 1726 by Tom Pisano  Outcome: Progressing Towards Goal  2/16/2021 1439 by Tom Pisano  Outcome: Progressing Towards Goal  Goal: *Hemodynamically stable  2/16/2021 1726 by Tom Pisano  Outcome: Progressing Towards Goal  2/16/2021 1439 by Tom Pisano  Outcome: Progressing Towards Goal  Goal: *Tolerating diet  2/16/2021 1726 by Tom Pisano  Outcome: Progressing Towards Goal  2/16/2021 1439 by Tom Pisano  Outcome: Progressing Towards Goal  Goal: *Patient verbalizes understanding of discharge instructions  2/16/2021 1726 by Tom Pisano  Outcome: Progressing Towards Goal  2/16/2021 1439 by Tom Pisano  Outcome: Progressing Towards Goal     Problem: Falls - Risk of  Goal: *Absence of Falls  Description: Document Sandy Caban Fall Risk and appropriate interventions in the flowsheet. 2/16/2021 1726 by Tom Pisano  Outcome: Progressing Towards Goal  Note: Fall Risk Interventions:  Mobility Interventions: PT Consult for mobility concerns, Patient to call before getting OOB, Utilize walker, cane, or other assistive device         Medication Interventions: Bed/chair exit alarm, Patient to call before getting OOB, Teach patient to arise slowly    Elimination Interventions:  Toileting schedule/hourly rounds, Call light in reach, Urinal in reach           2/16/2021 1439 by Tom Pisano  Outcome: Progressing Towards Goal  Note: Fall Risk Interventions:                                Problem: Patient Education: Go to Patient Education Activity  Goal: Patient/Family Education  2/16/2021 1726 by Tom Pisano  Outcome: Progressing Towards Goal  2/16/2021 1439 by Laisha Hall  Outcome: Progressing Towards Goal     Problem: Patient Education: Go to Patient Education Activity  Goal: Patient/Family Education  Outcome: Progressing Towards Goal     Problem: Pressure Injury - Risk of  Goal: *Prevention of pressure injury  Description: Document Darryl Scale and appropriate interventions in the flowsheet.   Outcome: Progressing Towards Goal     Problem: Patient Education: Go to Patient Education Activity  Goal: Patient/Family Education  Outcome: Progressing Towards Goal

## 2021-02-16 NOTE — OP NOTES
Operative Note      Patient: Andrew Wilkinson     Date of Surgery: 2/16/2021         YOB: 1945      Age:  70 y.o.        LOS:  LOS: 0 days       Preoperative Diagnosis:  Primary osteoarthritis of left knee [M17.12]    Postoperative Diagnosis: Primary osteoarthritis of left knee [M17.12]      Surgeon:  Álvaro Knight MD     Assistant:  Alyse Medley     Anesthesia:  general anesthesia and adductor block     Procedure:  Procedure(s):  LEFT TOTAL KNEE ARTHROPLASTY/BIOMET/2 SA'S/ADDUCTOR BLOCK    Time out performed: YES    Estimated Blood Loss:  min           Implants:    Implant Name Type Inv.  Item Serial No.  Lot No. LRB No. Used Action   CEMENT BNE 40GM W/ GENT HI VISC RADPQ FOR REV SURG - [id number]  CEMENT BNE 40GM W/ GENT HI VISC RADPQ FOR REV SURG  [employer]_ [id number] Left 1 Implanted   STEM TIB L80MM DIA12.5MM KNEE MARILEE FIN VANGUARD COMPLT SYS - [id number]  STEM TIB L80MM DIA12.5MM KNEE MARILEE FIN VANGUARD COMPLT SYS  [facility] [id number] Left 1 Implanted   TRAY TIB L83MM KNEE COPOL[employer] [place] INTLOK MARILEE LAINA VANGUARD - [id number]  TRAY TIB L83MM KNEE COPOL[place] INTLOK MARILEE LAINA VANGUARD  [employer]_ [id number] Left 1 Implanted   COMPONENT FEM 70MM L KNEE TINBN NP POST STBL OPN BX INTLOK - [id number]  COMPONENT FEM 70MM L KNEE TINBN NP POST STBL OPN BX INTLOK  [employer]_ [id number] Left 1 Implanted   COMPONENT PAT [id number] THK8.6MM THN KNEE POLY 3 PEG SER A - [id number]  COMPONENT PAT [id number] THK8.6MM THN KNEE POLY 3 PEG SER A  [employer]_ [id number] Left 1 Implanted   BEARING TIB PS 79/83X12 MM KNEE VIVACIT-E VANGUARD - [id number]  BEARING TIB PS 79/83X12 MM KNEE VIVACIT-E Curlee Lean [employer]_ [id number] Left 1 Implanted       Specimens: * No specimens in log *            Complications:  None    DESCRIPTION OF PROCEDURE: After satisfactory general and adductor block anesthesia, in the supine position, patient's knee was prepped with Betacept and ChloroPrep solution and draped in the usual fashion for knee replacement. The tourniquet was inflated to 350 mmHg after exsanguination and elevation. A longitudinal anterior skin incision was made carried down through the subcutaneus tissue to the underlying extensor mechanism. A medial parapatellar arthrotomy was carried proximally in a subvastus approach sparing the vastus medialis obliqus insertion on the quadriceps tendon. The patella was everted and retracted laterally as the knee was flexed. An anterior joint line debridement was carried out. The proximal tibial cut was made using the extramedullary tibial cutting guide and an oscillating saw. The distal femoral cuts were made using an intramedullary femoral cutting guide and a 8 degree cutting angle based on preoperative x-ray measurements of the femur. The distal femoral resection was set at 9 mm. The notch cut was made for a 70 mm left posterior stabilized femoral component using the bone conserving cutting slot. Osteophytes were removed from the joint margins including the posterior femoral condyles. The tibial stem hole and fin impressions were made for the 83 mm tibial base plate. The patella was cut to recreate the native patellar thickness of 22 mm. Three drill holes were placed in the patella for a 37 mm three pegged all polyethylene patellar component. Trial components were inserted and the knee was found to have full extension and good stability with a 12 mm tibial spacer in place. Trial components were then removed and the knee was irrigated thoroughly. The knee was injected with 266 mg/20 cc Exparel mixed with 40 ml saline as the cement hardened. The actual components were then cemented into place with gentamycin bone cement. All excess cement was carefully removed. The knee was held in extension with a clamp on the patella as the cement hardened.  Once the cement fully hardened, the knee was again checked for excess cement. The actual PS E poly tibial spacer was attached to the tibial base plate with a locking bar. The knee was reduced and was found to be stable with full knee extension, good stability, and mid line patellar tracking with no thumb pressure applied. The knee was again irrigated thoroughly with dilute betadine and Pulsavac saline lavage. A Surgivac drain was inserted. Closure was obtained using #2 Vicryl interrupted sutures for the capsular closure, 2-0 Vicryl suture for the subcutaneous tissues and staples for the skin. A sterile dressing was applied and Mr. Eric Beckham was transported to the recovery room in good condition. Sponge and needle count was correct.

## 2021-02-16 NOTE — ANESTHESIA PROCEDURE NOTES
Peripheral Block    Start time: 2/16/2021 10:14 AM  End time: 2/16/2021 10:19 AM  Performed by: Oscar Huynh DO  Authorized by: Oscar Huynh DO       Pre-procedure: Indications: at surgeon's request, post-op pain management, procedure for pain and primary anesthetic    Preanesthetic Checklist: patient identified, risks and benefits discussed, site marked, timeout performed, anesthesia consent given and patient being monitored    Timeout Time: 10:14          Block Type:   Block Type:   Adductor canal  Laterality:  Left  Monitoring:  Standard ASA monitoring, continuous pulse ox, frequent vital sign checks, heart rate, oxygen and responsive to questions  Injection Technique:  Single shot  Procedures: ultrasound guided    Patient Position: supine  Prep: chlorhexidine    Location:  Mid thigh  Needle Type:  Stimuplex  Needle Gauge:  21 G  Needle Localization:  Ultrasound guidance  Medication Injected:  FentaNYL citrate (PF) injection sedation initial, 100 mcg  midazolam (VERSED) injection, 2 mg  ropivacaine (NAROPIN) 2 mg/mL (0.2 %) injection, 60 mg  Med Admin Time: 2/16/2021 10:19 AM    Assessment:  Number of attempts:  1  Injection Assessment:  Incremental injection every 5 mL, no paresthesia, ultrasound image on chart, local visualized surrounding nerve on ultrasound, negative aspiration for blood and no intravascular symptoms  Patient tolerance:  Patient tolerated the procedure well with no immediate complications

## 2021-02-16 NOTE — ANESTHESIA PREPROCEDURE EVALUATION
Relevant Problems   CARDIOVASCULAR   (+) Primary hypertension      GASTROINTESTINAL   (+) Gastroesophageal reflux disease without esophagitis       Anesthetic History   No history of anesthetic complications            Review of Systems / Medical History  Patient summary reviewed, nursing notes reviewed and pertinent labs reviewed    Pulmonary  Within defined limits                 Neuro/Psych   Within defined limits           Cardiovascular    Hypertension                   GI/Hepatic/Renal     GERD           Endo/Other    Diabetes         Other Findings              Physical Exam    Airway  Mallampati: II  TM Distance: 4 - 6 cm  Neck ROM: normal range of motion   Mouth opening: Normal     Cardiovascular  Regular rate and rhythm,  S1 and S2 normal,  no murmur, click, rub, or gallop             Dental  No notable dental hx       Pulmonary  Breath sounds clear to auscultation               Abdominal  GI exam deferred       Other Findings            Anesthetic Plan    ASA: 3  Anesthesia type: general      Post-op pain plan if not by surgeon: peripheral nerve block single    Induction: Intravenous  Anesthetic plan and risks discussed with: Patient

## 2021-02-16 NOTE — PROGRESS NOTES
TRANSFER - IN REPORT:    Verbal report received from [address] [place] [address] RN(name) on [employer]  being received from PACU(unit) for routine post - op      Report consisted of patients Situation, Background, Assessment and   Recommendations(SBAR). Information from the following report(s) SBAR, OR Summary, Procedure Summary and MAR was reviewed with the receiving nurse. Opportunity for questions and clarification was provided. Assessment completed upon patients arrival to unit and care assumed.

## 2021-02-16 NOTE — ANESTHESIA POSTPROCEDURE EVALUATION
Procedure(s):  LEFT TOTAL KNEE ARTHROPLASTY/BIOMET/2 SA'S/ADDUCTOR BLOCK.    general    Anesthesia Post Evaluation      Multimodal analgesia: multimodal analgesia used between 6 hours prior to anesthesia start to PACU discharge  Patient location during evaluation: bedside  Patient participation: complete - patient participated  Level of consciousness: awake  Pain management: adequate  Airway patency: patent  Anesthetic complications: no  Cardiovascular status: stable  Respiratory status: acceptable  Hydration status: acceptable  Post anesthesia nausea and vomiting:  controlled      INITIAL Post-op Vital signs:   Vitals Value Taken Time   /71 02/16/21 1416   Temp 36.3 °C (97.3 °F) 02/16/21 1416   Pulse 91 02/16/21 1422   Resp 14 02/16/21 1422   SpO2 98 % 02/16/21 1421   Vitals shown include unvalidated device data.

## 2021-02-16 NOTE — PROGRESS NOTES
Problem: Mobility Impaired (Adult and Pediatric)  Goal: *Acute Goals and Plan of Care (Insert Text)  Description: Physical Therapy Goals  Initiated 2/16/2021 and to be accomplished within 7 day(s)  1. Patient will move from supine to sit and sit to supine  in bed with modified independence. 2.  Patient will transfer from bed to chair and chair to bed with modified independence using the least restrictive device. 3.  Patient will perform sit to stand with modified independence. 4.  Patient will ambulate with modified independence for 250 feet with the least restrictive device. 5.  Patient will ascend/descend 13 stairs with handrail(s) with supervision/set-up. PLOF: Patient reports he was independent with self care and functional mobility without AD. Pt lives with spouse in [facility] with 13 steps to bedroom and a \"slab step\" to get into his home. .  Outcome: Progressing Towards Goal     PHYSICAL THERAPY EVALUATION    Patient: Kasie Arechiga (76 y.o. male)  Date: 2/16/2021  Primary Diagnosis: Primary osteoarthritis of left knee [M17.12]  Procedure(s) (LRB):  LEFT TOTAL KNEE ARTHROPLASTY/BIOMET/2 SA'S/ADDUCTOR BLOCK (Left) Day of Surgery   Precautions:   Fall, WBAT    ASSESSMENT :  Patient is 69 yo male admitted to hospital for left TKA and presents today slightly drowsy and agreeable to therapy. Patient was educated on weight bearing status, role of therapy, TE (see below), and equipment in room including role of SCDs and towel roll. Patient demonstrated left SLR and transferred to sitting EOB with SBA for objective assessment. Patient was given demo with instruction on sit <> stand transfer and gait training. Pt c/o nausea, + emesis, and RN administered Zofran. Patient transferred to standing with CGA and ambulated 6ft to the recliner with step to gait.  At conclusion of session patient transferred to sitting in recline and was left resting with call bell by the side, SCDs donned, and towel roll under ankle. Patient instructed to call for assistance if they needed to get up for any reason and denied need for further assistance. Patient demonstrates decreased strength, mobility, and endurance and will continue to benefit from skilled PT during current hospitalization in order to ensure safe return home. Patient will benefit from skilled intervention to address the above impairments. Patient's rehabilitation potential is considered to be Good  Factors which may influence rehabilitation potential include:   []         None noted  []         Mental ability/status  [x]         Medical condition  []         Home/family situation and support systems  []         Safety awareness  []         Pain tolerance/management  []         Other:      PLAN :  Recommendations and Planned Interventions:   []           Bed Mobility Training             [x]    Neuromuscular Re-Education  [x]           Transfer Training                   []    Orthotic/Prosthetic Training  [x]           Gait Training                          []    Modalities  [x]           Therapeutic Exercises           []    Edema Management/Control  [x]           Therapeutic Activities            []    Family Training/Education  [x]           Patient Education  []           Other (comment):    Frequency/Duration: Patient will be followed by physical therapy 1-2 times per day/4-7 days per week to address goals. Discharge Recommendations: Home Health  Further Equipment Recommendations for Discharge: bedside commode     SUBJECTIVE:   Patient stated I am just sleepy.     OBJECTIVE DATA SUMMARY:     Past Medical History:   Diagnosis Date    Diabetes ([address]     GERD (gastroesophageal reflux disease)     Hypertension     Impaired fasting glucose February 2010     Past Surgical History:   Procedure Laterality Date    ENDOSCOPY, COLON, DIAGNOSTIC  2006    DC ABDOMEN SURGERY PROC UNLISTED  1968    secondary to injury     Barriers to Learning/Limitations: None  Compensate with: N/A  Home Situation:  Home Situation  Home Environment: Private residence  # Steps to Enter: 1  Rails to [email or web address]: No  One/Two Story Residence: Two story  # of Interior Steps: 15  Interior Rails: Right  Living Alone: No  Support Systems: Spouse/Significant Other/Partner  Patient Expects to be Discharged to[de-identified] Private residence  Current DME Used/Available at Home: Walker, rolling  Critical Behavior:  Neurologic State: Alert;Drowsy  Orientation Level: Oriented X4  Cognition: Follows commands  Safety/Judgement: Fall prevention  Psychosocial  Patient Behaviors: Calm; Cooperative  Purposeful Interaction: Yes  Pt Identified Daily Priority: Clinical issues (comment)  Will Process: Teaching/learning  Caring Interventions: Reassure        Strength:    Strength: Generally decreased, functional       Tone & Sensation:   Tone: Normal  Sensation: Intact       Range Of Motion:  AROM: Generally decreased, functional(left TKA; right LE WFL)           Functional Mobility:  Bed Mobility:     Supine to Sit: Stand-by assistance     Scooting: Stand-by assistance(to EOB)      Transfers:  Sit to Stand: Contact guard assistance  Stand to Sit: Contact guard assistance        Balance:   Sitting: Intact  Standing: Impaired; With support  Standing - Static: Fair((+))  Standing - Dynamic : Fair    Ambulation/Gait Training:  Distance (ft): 6 Feet (ft)  Assistive Device: Walker, rolling  Ambulation - Level of Assistance: Contact guard assistance  Gait Abnormalities: Decreased step clearance; Step to gait  Step Length: Right shortened;Left shortened     Therapeutic Exercises:   10x ankle pumps, 5x5\" quad set, 3x SLR, 10x LAQ, 5x heel slides    Pain:  Pain level pre-treatment: 6-7/10 left knee  Pain level post-treatment: 7-8/10   Pain Intervention(s) : Medication (see MAR);  Rest, Ice, Repositioning  Response to intervention: Nurse notified, See doc flow    Activity Tolerance:   Fair; limited by nausea  Please refer to the flowsheet for vital signs taken during this treatment. After treatment:   [x]         Patient left in no apparent distress sitting up in chair  []         Patient left in no apparent distress in bed  [x]         Call bell left within reach  [x]         Nursing notified  []         Caregiver present  []         Bed alarm activated  []         SCDs applied    COMMUNICATION/EDUCATION:   [x]         Role of Physical Therapy in the acute care setting. [x]         Fall prevention education was provided and the patient/caregiver indicated understanding. [x]         Patient/family have participated as able in goal setting and plan of care. [x]         Patient/family agree to work toward stated goals and plan of care. []         Patient understands intent and goals of therapy, but is neutral about his/her participation. []         Patient is unable to participate in goal setting/plan of care: ongoing with therapy staff.  []         Other:     Thank you for this referral.  Joao Verma, PT   Time Calculation: 34 mins      Eval Complexity: History: LOW Complexity : Zero comorbidities / personal factors that will impact the outcome / POCExam:LOW Complexity : 1-2 Standardized tests and measures addressing body structure, function, activity limitation and / or participation in recreation  Presentation: LOW Complexity : Stable, uncomplicated  Clinical Decision Making:Low Complexity    Overall Complexity:LOW

## 2021-02-16 NOTE — INTERVAL H&P NOTE
Update History & Physical 
 
The Patient's History and Physical of February 16, 2021 was reviewed with the patient and I examined the patient. There was no change. The surgical site was confirmed by the patient and me. Plan:  The risk, benefits, expected outcome, and alternative to the recommended procedure have been discussed with the patient. Patient understands and wants to proceed with the procedure.  
 
Electronically signed by Sammy Rodarte MD on 2/16/2021 at 9:55 AM

## 2021-02-16 NOTE — PERIOP NOTES
TRANSFER - OUT REPORT:    Verbal report given to [facility] [place]name) on Andrew Wilkinson  being transferred to 2 Surgical(unit) for routine post - op       Report consisted of patients Situation, Background, Assessment and   Recommendations(SBAR). Information from the following report(s) SBAR, OR Summary, Procedure Summary, Intake/Output and MAR was reviewed with the receiving nurse. Lines:   Peripheral IV 02/16/21 Right Wrist (Active)   Site Assessment Clean, dry, & intact 02/16/21 1321   Phlebitis Assessment 0 02/16/21 1321   Infiltration Assessment 0 02/16/21 1321   Dressing Status Clean, dry, & intact 02/16/21 1321   Dressing Type Transparent;Tape 02/16/21 1321   Hub Color/Line Status Pink; Infusing 02/16/21 1321        Opportunity for questions and clarification was provided. Patient transported with:   O2 @ 3 liters  Tech     Attempted to call wife but no answer.

## 2021-02-16 NOTE — BRIEF OP NOTE
Brief Postoperative Note    Patient: Mary Jasso  YOB: 1945  MRN: [id number]    Date of Procedure: 2/16/2021     Pre-Op Diagnosis: Primary osteoarthritis of left knee [M17.12]    Post-Op Diagnosis: Same as preoperative diagnosis. Procedure(s):  LEFT TOTAL KNEE ARTHROPLASTY/BIOMET/2 SA'S/ADDUCTOR BLOCK    Surgeon(s):  Betsy Rosas MD    Surgical Assistant: Surg Asst-1: Mario Silver    Anesthesia: General     Estimated Blood Loss (mL): Minimal    Complications: None    Specimens: * No specimens in log *     Implants:   Implant Name Type Inv.  Item Serial No.  Lot No. LRB No. Used Action   CEMENT BNE 40GM W/ GENT HI VISC RADPQ FOR REV SURG - [id number]  CEMENT BNE 40GM W/ GENT HI VISC RADPQ FOR REV SURG  [employer]_ [id number] Left 1 Implanted   STEM TIB L80MM DIA12.5MM KNEE MARILEE FIN VANGUARD COMPLT SYS - [id number]  STEM TIB L80MM DIA12.5MM KNEE MARILEE FIN VANGUARD COMPLT SYS  [employer]_ [id number] Left 1 Implanted   TRAY TIB L83MM KNEE COPOLY[employer]ER [place] INTLOK MARILEE LAINA VANGUARD - [id number]  TRAY TIB L83MM KNEE COPOL[employer]ER [place] INTLOK MARILEE LAINA VANGUARD  [employer]_ [id number] Left 1 Implanted   COMPONENT FEM 70MM L KNEE TINBN NP POST STBL OPN BX INTLOK - [id number]  COMPONENT FEM 70MM L KNEE TINBN NP POST STBL OPN BX INTLOK  [employer]_ [id number] Left 1 Implanted   COMPONENT PAT [id number] THK8.6MM THN KNEE POLY 3 PEG SER A - [id number]  COMPONENT PAT [id number] THK8.6MM THN KNEE POLY 3 PEG SER A  [employer]_ [id number] Left 1 Implanted   BEARING TIB PS 79/83X12 MM KNEE VIVACIT-E VANGUARD - [id number]  BEARING TIB PS 33/93D03 MM KNEE VIVACIT-E Rudy Bevels ORTHOPEDICS_ [id number] Left 1 Implanted       Drains:   Hemovac (Active)       Findings: above    Electronically Signed by Awilda Jesus MD on 2/16/2021 at 1:06 PM

## 2021-02-17 ENCOUNTER — HOME HEALTH ADMISSION (OUTPATIENT)
Dept: HOME HEALTH SERVICES | Facility: HOME HEALTH | Age: 76
End: 2021-02-17
Payer: MEDICARE

## 2021-02-17 VITALS
RESPIRATION RATE: 16 BRPM | HEIGHT: 67 IN | SYSTOLIC BLOOD PRESSURE: 131 MMHG | OXYGEN SATURATION: 98 % | BODY MASS INDEX: 27.39 KG/M2 | HEART RATE: 77 BPM | TEMPERATURE: 98.4 F | WEIGHT: 174.5 LBS | DIASTOLIC BLOOD PRESSURE: 81 MMHG

## 2021-02-17 LAB
GLUCOSE BLD STRIP.AUTO-MCNC: 116 MG/DL (ref 70–110)
HCT VFR BLD AUTO: 29.5 % (ref 36–48)
HGB BLD-MCNC: 10.3 G/DL (ref 13–16)

## 2021-02-17 PROCEDURE — 74011250636 HC RX REV CODE- 250/636: Performed by: SPECIALIST

## 2021-02-17 PROCEDURE — 85018 HEMOGLOBIN: CPT

## 2021-02-17 PROCEDURE — 97535 SELF CARE MNGMENT TRAINING: CPT

## 2021-02-17 PROCEDURE — 36415 COLL VENOUS BLD VENIPUNCTURE: CPT

## 2021-02-17 PROCEDURE — 82962 GLUCOSE BLOOD TEST: CPT

## 2021-02-17 PROCEDURE — 97530 THERAPEUTIC ACTIVITIES: CPT

## 2021-02-17 PROCEDURE — 97116 GAIT TRAINING THERAPY: CPT

## 2021-02-17 PROCEDURE — 97165 OT EVAL LOW COMPLEX 30 MIN: CPT

## 2021-02-17 PROCEDURE — 2709999900 HC NON-CHARGEABLE SUPPLY

## 2021-02-17 PROCEDURE — 74011250637 HC RX REV CODE- 250/637: Performed by: SPECIALIST

## 2021-02-17 RX ORDER — OXYCODONE HYDROCHLORIDE 5 MG/1
5 TABLET ORAL
Qty: 48 TAB | Refills: 0 | Status: SHIPPED | OUTPATIENT
Start: 2021-02-17 | End: 2021-02-25 | Stop reason: SDUPTHER

## 2021-02-17 RX ORDER — AMOXICILLIN 250 MG
1 CAPSULE ORAL DAILY
Qty: 30 TAB | Refills: 0 | Status: SHIPPED | OUTPATIENT
Start: 2021-02-17

## 2021-02-17 RX ORDER — KETOROLAC TROMETHAMINE 15 MG/ML
15 INJECTION, SOLUTION INTRAMUSCULAR; INTRAVENOUS ONCE
Status: COMPLETED | OUTPATIENT
Start: 2021-02-17 | End: 2021-02-17

## 2021-02-17 RX ADMIN — PANTOPRAZOLE SODIUM 40 MG: 40 TABLET, DELAYED RELEASE ORAL at 08:11

## 2021-02-17 RX ADMIN — CEFAZOLIN SODIUM 2 G: 2 SOLUTION INTRAVENOUS at 01:00

## 2021-02-17 RX ADMIN — SODIUM CHLORIDE, SODIUM LACTATE, POTASSIUM CHLORIDE, AND CALCIUM CHLORIDE 50 ML/HR: 600; 310; 30; 20 INJECTION, SOLUTION INTRAVENOUS at 03:16

## 2021-02-17 RX ADMIN — OXYCODONE HYDROCHLORIDE 10 MG: 5 TABLET ORAL at 13:19

## 2021-02-17 RX ADMIN — ACETAMINOPHEN 650 MG: 325 TABLET ORAL at 00:52

## 2021-02-17 RX ADMIN — RIVAROXABAN 10 MG: 10 TABLET, FILM COATED ORAL at 08:11

## 2021-02-17 RX ADMIN — ATORVASTATIN CALCIUM 40 MG: 40 TABLET, FILM COATED ORAL at 08:11

## 2021-02-17 RX ADMIN — HYDROCHLOROTHIAZIDE 25 MG: 25 TABLET ORAL at 08:11

## 2021-02-17 RX ADMIN — ACETAMINOPHEN 650 MG: 325 TABLET ORAL at 06:22

## 2021-02-17 RX ADMIN — OXYCODONE HYDROCHLORIDE 10 MG: 5 TABLET ORAL at 00:59

## 2021-02-17 RX ADMIN — METFORMIN HYDROCHLORIDE 1000 MG: 500 TABLET ORAL at 08:11

## 2021-02-17 RX ADMIN — KETOROLAC TROMETHAMINE 15 MG: 15 INJECTION, SOLUTION INTRAMUSCULAR; INTRAVENOUS at 13:19

## 2021-02-17 RX ADMIN — ACETAMINOPHEN 650 MG: 325 TABLET ORAL at 12:13

## 2021-02-17 RX ADMIN — DOCUSATE SODIUM 50MG AND SENNOSIDES 8.6MG 1 TABLET: 8.6; 5 TABLET, FILM COATED ORAL at 08:11

## 2021-02-17 RX ADMIN — OXYCODONE HYDROCHLORIDE 10 MG: 5 TABLET ORAL at 08:11

## 2021-02-17 RX ADMIN — Medication 10 ML: at 06:21

## 2021-02-17 NOTE — PROGRESS NOTES
Problem: Mobility Impaired (Adult and Pediatric)  Goal: *Acute Goals and Plan of Care (Insert Text)  Description: Physical Therapy Goals  Initiated 2/16/2021 and to be accomplished within 7 day(s)  1. Patient will move from supine to sit and sit to supine  in bed with modified independence. 2.  Patient will transfer from bed to chair and chair to bed with modified independence using the least restrictive device. 3.  Patient will perform sit to stand with modified independence. 4.  Patient will ambulate with modified independence for 250 feet with the least restrictive device. 5.  Patient will ascend/descend 13 stairs with handrail(s) with supervision/set-up. PLOF: Patient reports he was independent with self care and functional mobility without AD. Pt lives with spouse in [facility] with 13 steps to bedroom and a \"slab step\" to get into his home. Outcome: Progressing Towards Goal     PHYSICAL THERAPY TREATMENT    Patient: Slim Childs (85 y.o. male)  Date: 2/17/2021  Diagnosis: Primary osteoarthritis of left knee [M17.12] <principal problem not specified>  Procedure(s) (LRB):  LEFT TOTAL KNEE ARTHROPLASTY/BIOMET/2 SA'S/ADDUCTOR BLOCK (Left) 1 Day Post-Op  Precautions: Fall, WBAT    ASSESSMENT:  Nursing cleared pt for therapy treatment. Pt received sitting in recliner, alert and agreeable to therapy. Reviewed with patient AD management & how to negotiate stairs. Pt performed therapeutic exercises per flow sheet. Pt came to standing at RW with SBA. He ambulated 200 ft SBA with decreased step length and step clearance, increased stance time on right LE and antalgic gait. He negotiated 4 steps x4 SBA with use of R HR with moderate VCs and reminders for stepping up with uninvolved leg and down with surgical leg. Pt returned to recliner SBA in NAD, with call bell within reach and all needs met. Nursing notified.  Pt would benefit from follow up session in order to ensure safety with mobility and stair negotiation. Progression toward goals:   [x]      Improving appropriately and progressing toward goals  []      Improving slowly and progressing toward goals  []      Not making progress toward goals and plan of care will be adjusted     PLAN:  Patient continues to benefit from skilled intervention to address the above impairments. Continue treatment per established plan of care. Discharge Recommendations:  Home Health  Further Equipment Recommendations for Discharge:  bedside commode     SUBJECTIVE:   Patient stated I don't know if I can do stairs yet.     OBJECTIVE DATA SUMMARY:   Critical Behavior:  Neurologic State: Alert  Orientation Level: Oriented X4  Cognition: Follows commands, Appropriate for age attention/concentration  Safety/Judgement: Fall prevention  Functional Mobility Training:  Bed Mobility:     Supine to Sit: Modified independent     Transfers:  Sit to Stand: Stand-by assistance  Stand to Sit: Stand-by assistance      Balance:  Sitting: Intact  Standing: Impaired; With support  Standing - Static: Good  Standing - Dynamic : Fair        Ambulation/Gait Training:  Distance (ft): 200 Feet (ft)  Assistive Device: Walker, rolling  Ambulation - Level of Assistance: Stand-by assistance     Gait Description (WDL): Exceptions to WDL  Gait Abnormalities: Decreased step clearance; Step to gait; Antalgic     Stance: Right increased; Left decreased  Speed/Oanh: Slow  Step Length: Left shortened;Right shortened    Stairs:  Number of Stairs Trained: 16(4 steps x 4)  Stairs - Level of Assistance: Stand-by assistance  Rail Use: Right     Therapeutic Exercises:       EXERCISE   Sets   Reps   Active Active Assist   Passive Self ROM   Comments   Ankle Pumps    [] [] [] []    Quad Sets/Glut Sets    [] [] [] []    Hamstring Sets   [] [] [] []    Short Arc Quads   [] [] [] []    Seated Heel Slides 1 5 [x] [] [] [] 3 sec hold, with towel   Straight Leg Raises   [] [] [] []    Hip Add   [] [] [] []    Long Arc Quads 1 5 [x] [] [] [] 3 sec hold   Seated Marching   [] [] [] []    Standing Marching   [] [] [] []       [] [] [] []        Pain:  Pain level pre-treatment: 5/10 (10/10 with movement)  Pain level post-treatment: 5/10   Pain Intervention(s): Medication (see MAR); Rest, Ice, Repositioning   Response to intervention: Nurse notified, See doc flow    Activity Tolerance:   Good  Please refer to the flowsheet for vital signs taken during this treatment. After treatment:   [x] Patient left in no apparent distress sitting up in chair  [] Patient left in no apparent distress in bed  [x] Call bell left within reach  [x] Nursing notified  [] Caregiver present  [] Bed alarm activated  [] SCDs applied      COMMUNICATION/EDUCATION:   [x]         Role of Physical Therapy in the acute care setting. [x]         Fall prevention education was provided and the patient/caregiver indicated understanding. [x]         Patient/family have participated as able in working toward goals and plan of care. []         Patient/family agree to work toward stated goals and plan of care. []         Patient understands intent and goals of therapy, but is neutral about his/her participation. []         Patient is unable to participate in stated goals/plan of care: ongoing with therapy staff.  []         Other:        Tad Clarity   Time Calculation: 28 mins     A student participated in this treatment session. Per CMS Medicare statements and guidelines I certify that the following was true:   1. I was present and directly observed the entire session. 2. I made all skilled judgments and clinical decisions regarding care. 3. I am the practitioner responsible for assessment, treatment, and documentation.     Thank you,   Jenifer Robles, PT, DPT

## 2021-02-17 NOTE — PROGRESS NOTES
Discharge instructions given. Patient PIV removed, Hemovac removed with tip intact and pressure dressing applied. Dressing to left knee is clean dry and intact . No s/s of bleeding or infection noted.  Stable for discharge home with family

## 2021-02-17 NOTE — PROGRESS NOTES
Problem: Mobility Impaired (Adult and Pediatric)  Goal: *Acute Goals and Plan of Care (Insert Text)  Description: Physical Therapy Goals  Initiated 2/16/2021 and to be accomplished within 7 day(s)  1. Patient will move from supine to sit and sit to supine  in bed with modified independence. 2.  Patient will transfer from bed to chair and chair to bed with modified independence using the least restrictive device. 3.  Patient will perform sit to stand with modified independence. 4.  Patient will ambulate with modified independence for 250 feet with the least restrictive device. 5.  Patient will ascend/descend 13 stairs with handrail(s) with supervision/set-up. PLOF: Patient reports he was independent with self care and functional mobility without AD. Pt lives with spouse in [address] [place] [address] with 13 steps to bedroom and a \"slab step\" to get into his home. 2/17/2021 1128 by Dick Ramirez  Outcome: Resolved/Met       PHYSICAL THERAPY TREATMENT AND DISCHARGE    Patient: Ade Masterson (11 y.o. male)  Date: 2/17/2021  Diagnosis: Primary osteoarthritis of left knee [M17.12] <principal problem not specified>  Procedure(s) (LRB):  LEFT TOTAL KNEE ARTHROPLASTY/BIOMET/2 SA'S/ADDUCTOR BLOCK (Left) 1 Day Post-Op  Precautions: Fall, WBAT    ASSESSMENT:  Pt received sitting in recliner, alert and agreeable to therapy. Pt performed therapeutic exercises per flow sheet. Pt debbie to standing at RW modified independent. He ambulated 360 ft modified independent with decreased step clearance, shortened step length,and improved reciprocal gait. He negotiated 4 steps x2 supervised and did not require any cueing. Pt sat in recliner Ramirez. Pt left reclined in chair with call bell within reach and all needs met. Pt is safe from a mobility standpoint for discharge to home. Patient has met all mobility goals and will be removed from PT caseload.         PLAN:  Maximum therapeutic gains met at current level of care and patient will be discharged from physical therapy at this time. Rationale for discharge:  [x]     Goals Achieved  []     [address]  []     Patient not participating in therapy  []     Other:  Discharge Recommendations:  Home Health  Further Equipment Recommendations for Discharge:  bedside commode     SUBJECTIVE:   Patient stated I remember up with the good leg, down with the bad leg.     OBJECTIVE DATA SUMMARY:   Critical Behavior:  Neurologic State: Alert  Orientation Level: Oriented X4  Cognition: Follows commands, Appropriate for age attention/concentration  Safety/Judgement: Fall prevention  Functional Mobility Training:  Bed Mobility:     Supine to Sit: Modified independent     Transfers:  Sit to Stand: Modified independent  Stand to Sit: Modified independent    Balance:  Sitting: Intact  Standing: Impaired; With support  Standing - Static: Good  Standing - Dynamic : Fair((+))     Ambulation/Gait Training:  Distance (ft): 360 Feet (ft)  Assistive Device: Walker, rolling  Ambulation - Level of Assistance: Modified independent     Gait Description (WDL): Exceptions to WDL  Gait Abnormalities: Antalgic;Decreased step clearance     Stance: Right increased; Left decreased  Speed/Oanh: Shuffled  Step Length: Right shortened    Stairs:  Number of Stairs Trained: 4  Stairs - Level of Assistance: Supervision  Rail Use: Right   Therapeutic Exercises:       EXERCISE   Sets   Reps   Active Active Assist   Passive Self ROM   Comments   Ankle Pumps    [] [] [] []    Quad Sets/Glut Sets    [] [] [] [] Hold for 5 secs   Hamstring Sets   [] [] [] []    Short Arc Quads   [] [] [] []    Seated Heel Slides 1 5 [x] [] [] [] With towel, 5 s hold   Straight Leg Raises   [] [] [] []    Hip Add   [] [] [] [] Hold for 5 secs, w/ pillow squeeze   Long Arc Quads 1 5 [x] [] [] [] 5 sec hold   Seated Marching   [] [] [] []    Standing Marching   [] [] [] []       [] [] [] []        Pain:  Pain level pre-treatment: 7/10 (only when moving)  Pain level post-treatment: 7/10   Pain Intervention(s): Medication (see MAR); Rest, Ice, Repositioning   Response to intervention: Nurse notified, See doc flow    Activity Tolerance:   Good  Please refer to the flowsheet for vital signs taken during this treatment. After treatment:   [x] Patient left in no apparent distress sitting up in chair  [] Patient left in no apparent distress in bed  [x] Call bell left within reach  [x] Nursing notified  [] Caregiver present  [] Bed alarm activated  [] SCDs applied      COMMUNICATION/EDUCATION:   [x]         Role of Physical Therapy in the acute care setting. [x]         Fall prevention education was provided and the patient/caregiver indicated understanding. [x]         Patient/family have participated as able and agree with findings and recommendations. []         Patient is unable to participate in plan of care at this time. []         Other:        Fabrice De Leon   Time Calculation: 16 mins     A student participated in this treatment session. Per CMS Medicare statements and guidelines I certify that the following was true:   1. I was present and directly observed the entire session. 2. I made all skilled judgments and clinical decisions regarding care. 3. I am the practitioner responsible for assessment, treatment, and documentation.     Thank you,   Mando Moody, PT, DPT

## 2021-02-17 NOTE — PROGRESS NOTES
Problem: Patient Education: Go to Patient Education Activity  Goal: Patient/Family Education  2/17/2021 1024 by Shikha Bah  Outcome: Progressing Towards Goal  2/17/2021 1023 by Shikha Bah  Outcome: Progressing Towards Goal     Problem: Knee Replacement: Day of Surgery/Unit  Goal: Off Pathway (Use only if patient is Off Pathway)  2/17/2021 1024 by Shikha Bah  Outcome: Progressing Towards Goal  2/17/2021 1023 by Shikha Bah  Outcome: Progressing Towards Goal  Goal: Activity/Safety  2/17/2021 1024 by Shikha Bah  Outcome: Progressing Towards Goal  2/17/2021 1023 by Shikha Bah  Outcome: Progressing Towards Goal  Goal: Consults, if ordered  2/17/2021 1024 by Shikha Bah  Outcome: Progressing Towards Goal  2/17/2021 1023 by Shikha Bah  Outcome: Progressing Towards Goal  Goal: Diagnostic Test/Procedures  2/17/2021 1024 by Shikha Bah  Outcome: Progressing Towards Goal  2/17/2021 1023 by Shikha Bah  Outcome: Progressing Towards Goal  Goal: Nutrition/Diet  2/17/2021 1024 by Shikha Bah  Outcome: Progressing Towards Goal  2/17/2021 1023 by Shikha Bah  Outcome: Progressing Towards Goal  Goal: Medications  2/17/2021 1024 by Shikha Bah  Outcome: Progressing Towards Goal  2/17/2021 1023 by Shikha Bah  Outcome: Progressing Towards Goal  Goal: Respiratory  2/17/2021 1024 by Shikha Bah  Outcome: Progressing Towards Goal  2/17/2021 1023 by Shikha Bah  Outcome: Progressing Towards Goal  Goal: Treatments/Interventions/Procedures  2/17/2021 1024 by Shikha Bah  Outcome: Progressing Towards Goal  2/17/2021 1023 by Shikha Bah  Outcome: Progressing Towards Goal  Goal: Psychosocial  2/17/2021 1024 by Shikha Bah  Outcome: Progressing Towards Goal  2/17/2021 1023 by Shikha Bah  Outcome: Progressing Towards Goal  Goal: *Initiate mobility  2/17/2021 1024 by Shikha Bah  Outcome: Progressing Towards Goal  2/17/2021 1023 by Vallarie Keli  Outcome: Progressing Towards Goal  Goal: *Optimal pain control at patient's stated goal  2/17/2021 1024 by Vallarie Keli  Outcome: Progressing Towards Goal  2/17/2021 1023 by Vallarie Keli  Outcome: Progressing Towards Goal  Goal: *Hemodynamically stable  2/17/2021 1024 by Vallarie Keli  Outcome: Progressing Towards Goal  2/17/2021 1023 by Vallarie Keli  Outcome: Progressing Towards Goal     Problem: Knee Replacement: Post-Op Day 1  Goal: Off Pathway (Use only if patient is Off Pathway)  2/17/2021 1024 by Vallarie Keli  Outcome: Progressing Towards Goal  2/17/2021 1023 by Vallarie Keli  Outcome: Progressing Towards Goal  Goal: Activity/Safety  2/17/2021 1024 by Vallarie Keli  Outcome: Progressing Towards Goal  2/17/2021 1023 by Vallarie Keli  Outcome: Progressing Towards Goal  Goal: Diagnostic Test/Procedures  2/17/2021 1024 by Vallarie Keli  Outcome: Progressing Towards Goal  2/17/2021 1023 by Vallarie Keli  Outcome: Progressing Towards Goal  Goal: Nutrition/Diet  2/17/2021 1024 by Vallarie Keli  Outcome: Progressing Towards Goal  2/17/2021 1023 by Vallarie Keli  Outcome: Progressing Towards Goal  Goal: Medications  2/17/2021 1024 by Vallarie Keli  Outcome: Progressing Towards Goal  2/17/2021 1023 by Vallarie Keli  Outcome: Progressing Towards Goal  Goal: Respiratory  2/17/2021 1024 by Vallarie Keli  Outcome: Progressing Towards Goal  2/17/2021 1023 by Vallarie Keli  Outcome: Progressing Towards Goal  Goal: Treatments/Interventions/Procedures  2/17/2021 1024 by Vallarie Keli  Outcome: Progressing Towards Goal  2/17/2021 1023 by Vallarie Keli  Outcome: Progressing Towards Goal  Goal: Psychosocial  2/17/2021 1024 by Vallarie Keli  Outcome: Progressing Towards Goal  2/17/2021 1023 by Vallarie Keli  Outcome: Progressing Towards Goal  Goal: Discharge Planning  2/17/2021 1024 by Vallarie Keli  Outcome: Progressing Towards Goal  2/17/2021 1023 by Cherie Carvajal  Outcome: Progressing Towards Goal  Goal: *Demonstrates progressive activity  2/17/2021 1024 by Cherie Carvajal  Outcome: Progressing Towards Goal  2/17/2021 1023 by Cherie Carvajal  Outcome: Progressing Towards Goal  Goal: *Optimal pain control at patient's stated goal  2/17/2021 1024 by Cherie Carvajal  Outcome: Progressing Towards Goal  2/17/2021 1023 by Cherie Carvajal  Outcome: Progressing Towards Goal  Goal: *Hemodynamically stable  2/17/2021 1024 by Cherie Carvajal  Outcome: Progressing Towards Goal  2/17/2021 1023 by Cherie Carvajal  Outcome: Progressing Towards Goal  Goal: *Discharge plan identified  2/17/2021 1024 by Cherie Carvajal  Outcome: Progressing Towards Goal  2/17/2021 1023 by Cherie Carvajal  Outcome: Progressing Towards Goal     Problem: Knee Replacement: Post-Op Day 2  Goal: Off Pathway (Use only if patient is Off Pathway)  2/17/2021 1024 by Cherie Carvajal  Outcome: Progressing Towards Goal  2/17/2021 1023 by Cherie Carvajal  Outcome: Progressing Towards Goal  Goal: Activity/Safety  2/17/2021 1024 by Cherie Carvajal  Outcome: Progressing Towards Goal  2/17/2021 1023 by Cherie Carvajal  Outcome: Progressing Towards Goal  Goal: Diagnostic Test/Procedures  2/17/2021 1024 by Cherie Carvajal  Outcome: Progressing Towards Goal  2/17/2021 1023 by Cherie Carvajal  Outcome: Progressing Towards Goal  Goal: Medications  2/17/2021 1024 by Cherie Carvajal  Outcome: Progressing Towards Goal  2/17/2021 1023 by Cherie Carvajal  Outcome: Progressing Towards Goal  Goal: Respiratory  2/17/2021 1024 by Cherie Carvajal  Outcome: Progressing Towards Goal  2/17/2021 1023 by Cherie Carvajal  Outcome: Progressing Towards Goal  Goal: Treatments/Interventions/Procedures  2/17/2021 1024 by Cherie Carvajal  Outcome: Progressing Towards Goal  2/17/2021 1023 by Cherie Carvajal  Outcome: Progressing Towards Goal  Goal: Psychosocial  2/17/2021 1024 by Cherie Carvajal  Outcome: Progressing Towards Goal  2/17/2021 1023 by Anastasia Kapadia  Outcome: Progressing Towards Goal  Goal: *Met physical therapy criteria for discharge to the next level of care  2/17/2021 1024 by Anastasia Kapadia  Outcome: Progressing Towards Goal  2/17/2021 1023 by Anastasia Kapadia  Outcome: Progressing Towards Goal  Goal: *Optimal pain control with oral analgesia  2/17/2021 1024 by Anastasia Kapadia  Outcome: Progressing Towards Goal  2/17/2021 1023 by Anastasia Kapadia  Outcome: Progressing Towards Goal  Goal: *Hemodynamically stable  2/17/2021 1024 by Anastasia Kapadia  Outcome: Progressing Towards Goal  2/17/2021 1023 by Anastasia Kapadia  Outcome: Progressing Towards Goal  Goal: *Tolerating diet  2/17/2021 1024 by Anastasia Kapadia  Outcome: Progressing Towards Goal  2/17/2021 1023 by Anastasia Kapadia  Outcome: Progressing Towards Goal  Goal: *Patient verbalizes understanding of discharge instructions  2/17/2021 1024 by Anastasia Kapadia  Outcome: Progressing Towards Goal  2/17/2021 1023 by Anastasia Kapadia  Outcome: Progressing Towards Goal     Problem: Falls - Risk of  Goal: *Absence of Falls  Description: Document Stacy Vallejo Fall Risk and appropriate interventions in the flowsheet.   2/17/2021 1024 by Anastasia Kapadia  Outcome: Progressing Towards Goal  Note: Fall Risk Interventions:  Mobility Interventions: PT Consult for mobility concerns         Medication Interventions: Patient to call before getting OOB    Elimination Interventions: Call light in reach           2/17/2021 1023 by Anastasia Kapadia  Outcome: Progressing Towards Goal  Note: Fall Risk Interventions:  Mobility Interventions: PT Consult for mobility concerns         Medication Interventions: Patient to call before getting OOB    Elimination Interventions: Call light in reach              Problem: Patient Education: Go to Patient Education Activity  Goal: Patient/Family Education  2/17/2021 1024 by Anastasia Kapadia  Outcome: Progressing Towards Goal  2/17/2021 1023 by Brett Cobian  Outcome: Progressing Towards Goal     Problem: Patient Education: Go to Patient Education Activity  Goal: Patient/Family Education  2/17/2021 1024 by Brett Cobian  Outcome: Progressing Towards Goal  2/17/2021 1023 by Brett Cobian  Outcome: Progressing Towards Goal     Problem: Pressure Injury - Risk of  Goal: *Prevention of pressure injury  Description: Document Darryl Scale and appropriate interventions in the flowsheet.   2/17/2021 1024 by Brett Cobian  Outcome: Progressing Towards Goal  2/17/2021 1023 by Brett Cobian  Outcome: Progressing Towards Goal     Problem: Patient Education: Go to Patient Education Activity  Goal: Patient/Family Education  2/17/2021 1024 by Brett Cobian  Outcome: Progressing Towards Goal  2/17/2021 1023 by Brett Cobian  Outcome: Progressing Towards Goal     Problem: Patient Education: Go to Patient Education Activity  Goal: Patient/Family Education  2/17/2021 1024 by Brett Cobian  Outcome: Progressing Towards Goal  2/17/2021 1023 by Brett Cobian  Outcome: Progressing Towards Goal

## 2021-02-17 NOTE — ROUTINE PROCESS
Bedside and Verbal shift change report given to Alisa Mosley RN (oncoming nurse) by OBDULIA Marquez (offgoing nurse). Report included the following information Kardex, OR Summary and MAR.  
 
0700 
 
End of Shift Note  
 
Bedside and verbal shift change report given to OBDULIA Marquez (On coming nurse) by Alisa Mosley RN (Off going nurse). 
Report included the following information:  
   --Procedure Summary 
   --MAR, 
   --Recent Results 
   --Med Rec Status 
 
SBAR Recommendations: none 
 
Issues for Provider to address none 
 
      Activity This Shift 
 
 [] Bed Rest Order 
 [] Refused 
 [] Dangled  
 [] TDWB 
    
 
Ambulating: 
   [x] Bathroom 
   [] BSC 
   [] Room/Hallway   
 
Up in Chair for meals 
  [x]Yes [] No  
Voiding       [x] Yes  [] No 
Hartman          [] Yes  [x] No 
Incontinent [] Yes  [x] No 
 
DUE TO VOID done POUR        [] Yes [x] No 
Purewick    [] Yes [x] No 
New Onset [] Yes [x] No Straight Cath   []Yes  [x] No 
Condom Cath  [] Yes [x] No 
MD Called      [] Yes  [x] No  
Blood Sugars Managed []Yes [x] No   
Bowels Moved [] Yes [x] No 
 
Incontinent     [] Yes [x] No Passed Gas [x]Yes [] No 
 
New Onset  []Yes [x] No 
  
 
 MD Called []Yes  [x] No 
  
CHG Bath Done 
   Before Surgery 
   After Surgery  
  
[x] Yes  [] No 
[x] Yes  [] No   
  
Drain Removed [] Yes  [x] No [] N/A   
Dressing Changed [] Yes   [x] No [] N/A  
  
Nausea/Vomiting [] Yes   [x] No    
Ice Packs Changed [x] Yes   [] No  [] N/A   
Incentive Spirometer  [x] Yes  [] No     
SCD Pumps On  
 
Ankle Pumping  [x] Yes   [] No  
  
[] Yes   [x] No    
  
Telemetry Monitoring [] Yes   [x] No   Rhythm N/A

## 2021-02-17 NOTE — PROGRESS NOTES
Pt cleared by physical therapy for discharge to home with home health. Full note to follow.     Susan Reilly PT, DPT

## 2021-02-17 NOTE — PROGRESS NOTES
Reason for Admission:  Primary osteoarthritis of left knee [M17.12]                 RUR Score:    9           Plan for utilizing home health:    yes                      Likelihood of Readmission:   LOW                         Transition of Care Plan:              Initial assessment completed with patient. Cognitive status of patient: oriented to time, place, person and situation. Face sheet information confirmed:  yes. The patient designates his wife Kerri Crowder to participate in his discharge plan and to receive any needed information. This patient lives in a home with wife. Patient is not able to navigate steps as needed. Prior to hospitalization, patient was considered to be independent with ADLs/IADLS : yes . Patient has a current ACP document on file: no  The wife will be available to transport patient home upon discharge. The patient already has Walker, Rollator, BSC, and raised toilet seat medical equipment available in the home. Patient is not currently active with home health. Patient has not stayed in a skilled nursing facility or rehab. Was  stay within last 60 days : no. This patient is on dialysis :no      List of available Home Health agencies were provided and reviewed with the patient prior to discharge. Freedom of choice signed: yes, for [facility]. Currently, the discharge plan is Home with [address] [place] [address] Tony Thomason. The patient states that he can obtain his medications from the pharmacy, and take his medications as directed. Patient's current insurance is [employer]       Care Management Interventions  PCP Verified by CM: Yes  Palliative Care Criteria Met (RRAT>21 & CHF Dx)?: No  Mode of Transport at Discharge:  Other (see comment)(family)  Transition of Care Consult (CM Consult): Discharge Planning, [address]: Yes  Physical Therapy Consult: Yes  Occupational Therapy Consult: Yes  Speech Therapy Consult: No  Current Support Network: Lives with Spouse  Confirm Follow Up Transport: Family  The Patient and/or Patient Representative was Provided with a Choice of Provider and Agrees with the Discharge Plan?: Yes  Freedom of Choice List was Provided with Basic Dialogue that Supports the Patient's Individualized Plan of Care/Goals, Treatment Preferences and Shares the Quality Data Associated with the Providers?: Yes  Discharge Location  Discharge Placement: Home with home health        QUAN Summers RN  Care Management  Pager: [phone]

## 2021-02-17 NOTE — PROGRESS NOTES
End of Shift Note     Bedside and verbal shift change report given to Henrry Pierre (On coming nurse) by Fransisco Arriaga RN (Off going nurse).   Report included the following information:      --Procedure Summary     --MAR,     --Recent Results     --Med Rec Status

## 2021-02-17 NOTE — HOME CARE
Spoke with patient. Verified address and telephone numbers. Explained home care services and routines for SN PT/ [place] TKR protocol  Orders noted and arranged. Contact information for home care office on discharge paperwork.      Maritza Gifford RN, BSN   [employer]
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
## 2021-02-17 NOTE — PROGRESS NOTES
Problem: Patient Education: Go to Patient Education Activity  Goal: Patient/Family Education  2/17/2021 1254 by Shikha Bah  Outcome: Resolved/Met  2/17/2021 1024 by Shikha Bah  Outcome: Progressing Towards Goal  2/17/2021 1023 by Shikha Bah  Outcome: Progressing Towards Goal     Problem: Knee Replacement: Day of Surgery/Unit  Goal: Off Pathway (Use only if patient is Off Pathway)  2/17/2021 1254 by Shikha Bah  Outcome: Resolved/Met  2/17/2021 1024 by Shikha Bah  Outcome: Progressing Towards Goal  2/17/2021 1023 by Shikha Bah  Outcome: Progressing Towards Goal  Goal: Activity/Safety  2/17/2021 1254 by Shikha Bah  Outcome: Resolved/Met  2/17/2021 1024 by Shikha Bah  Outcome: Progressing Towards Goal  2/17/2021 1023 by Shikha Bah  Outcome: Progressing Towards Goal  Goal: Consults, if ordered  2/17/2021 1254 by Shikha Bah  Outcome: Resolved/Met  2/17/2021 1024 by Shikha Bah  Outcome: Progressing Towards Goal  2/17/2021 1023 by Shikha Bah  Outcome: Progressing Towards Goal  Goal: Diagnostic Test/Procedures  2/17/2021 1254 by Shikha Bah  Outcome: Resolved/Met  2/17/2021 1024 by Shikha Bah  Outcome: Progressing Towards Goal  2/17/2021 1023 by Shikha Bah  Outcome: Progressing Towards Goal  Goal: Nutrition/Diet  2/17/2021 1254 by Shikha Bah  Outcome: Resolved/Met  2/17/2021 1024 by Shikha Bah  Outcome: Progressing Towards Goal  2/17/2021 1023 by Shikha Bah  Outcome: Progressing Towards Goal  Goal: Medications  2/17/2021 1254 by Shikha Bah  Outcome: Resolved/Met  2/17/2021 1024 by Shikha Bah  Outcome: Progressing Towards Goal  2/17/2021 1023 by Shikha Bah  Outcome: Progressing Towards Goal  Goal: Respiratory  2/17/2021 1254 by Shikha Bah  Outcome: Resolved/Met  2/17/2021 1024 by Shikha Bah  Outcome: Progressing Towards Goal  2/17/2021 1023 by Shikha Bah  Outcome: Progressing Towards Goal  Goal: Treatments/Interventions/Procedures  2/17/2021 1254 by Bryan Webb  Outcome: Resolved/Met  2/17/2021 1024 by Bryan Webb  Outcome: Progressing Towards Goal  2/17/2021 1023 by Bryan Webb  Outcome: Progressing Towards Goal  Goal: Psychosocial  2/17/2021 1254 by Bryan Webb  Outcome: Resolved/Met  2/17/2021 1024 by Bryan Webb  Outcome: Progressing Towards Goal  2/17/2021 1023 by Bryan Webb  Outcome: Progressing Towards Goal  Goal: *Initiate mobility  2/17/2021 1254 by Bryan Webb  Outcome: Resolved/Met  2/17/2021 1024 by Bryan Webb  Outcome: Progressing Towards Goal  2/17/2021 1023 by Bryan Webb  Outcome: Progressing Towards Goal  Goal: *Optimal pain control at patient's stated goal  2/17/2021 1254 by Bryan Webb  Outcome: Resolved/Met  2/17/2021 1024 by Bryan Webb  Outcome: Progressing Towards Goal  2/17/2021 1023 by Bryan Webb  Outcome: Progressing Towards Goal  Goal: *Hemodynamically stable  2/17/2021 1254 by Bryan Webb  Outcome: Resolved/Met  2/17/2021 1024 by Bryan Webb  Outcome: Progressing Towards Goal  2/17/2021 1023 by Bryan Webb  Outcome: Progressing Towards Goal     Problem: Knee Replacement: Post-Op Day 1  Goal: Off Pathway (Use only if patient is Off Pathway)  2/17/2021 1254 by Bryan Webb  Outcome: Resolved/Met  2/17/2021 1024 by Bryan Webb  Outcome: Progressing Towards Goal  2/17/2021 1023 by Bryan Webb  Outcome: Progressing Towards Goal  Goal: Activity/Safety  2/17/2021 1254 by Bryan Webb  Outcome: Resolved/Met  2/17/2021 1024 by Bryan Webb  Outcome: Progressing Towards Goal  2/17/2021 1023 by Bryan Webb  Outcome: Progressing Towards Goal  Goal: Diagnostic Test/Procedures  2/17/2021 1254 by Bryan Webb  Outcome: Resolved/Met  2/17/2021 1024 by Bryan Webb  Outcome: Progressing Towards Goal  2/17/2021 1023 by Bryan Webb  Outcome: Progressing Towards Goal  Goal: Nutrition/Diet  2/17/2021 1254 by Yecenia Bucio  Outcome: Resolved/Met  2/17/2021 1024 by Yecenia Bucio  Outcome: Progressing Towards Goal  2/17/2021 1023 by Yecenia Bucio  Outcome: Progressing Towards Goal  Goal: Medications  2/17/2021 1254 by Yecenia Bucio  Outcome: Resolved/Met  2/17/2021 1024 by Yecenia Bucio  Outcome: Progressing Towards Goal  2/17/2021 1023 by Yecenia Bucio  Outcome: Progressing Towards Goal  Goal: Respiratory  2/17/2021 1254 by Yecenia Bucio  Outcome: Resolved/Met  2/17/2021 1024 by Yecenia Bucio  Outcome: Progressing Towards Goal  2/17/2021 1023 by Yecenia Bucio  Outcome: Progressing Towards Goal  Goal: Treatments/Interventions/Procedures  2/17/2021 1254 by Yecenia Bucio  Outcome: Resolved/Met  2/17/2021 1024 by Yecenia Bucio  Outcome: Progressing Towards Goal  2/17/2021 1023 by Yecenia Bucio  Outcome: Progressing Towards Goal  Goal: Psychosocial  2/17/2021 1254 by Yecenia Bucio  Outcome: Resolved/Met  2/17/2021 1024 by Yecenia Bucio  Outcome: Progressing Towards Goal  2/17/2021 1023 by Yecenia Bucio  Outcome: Progressing Towards Goal  Goal: Discharge Planning  2/17/2021 1254 by Yecenia Bucio  Outcome: Resolved/Met  2/17/2021 1024 by Yecenia Bucio  Outcome: Progressing Towards Goal  2/17/2021 1023 by Yecenia Bucio  Outcome: Progressing Towards Goal  Goal: *Demonstrates progressive activity  2/17/2021 1254 by Yecenia Bucio  Outcome: Resolved/Met  2/17/2021 1024 by Yecenia Bucio  Outcome: Progressing Towards Goal  2/17/2021 1023 by Yecenia Bucio  Outcome: Progressing Towards Goal  Goal: *Optimal pain control at patient's stated goal  2/17/2021 1254 by Yecenia Bucio  Outcome: Resolved/Met  2/17/2021 1024 by Yecenia Bucio  Outcome: Progressing Towards Goal  2/17/2021 1023 by Yecenia Bucio  Outcome: Progressing Towards Goal  Goal: *Hemodynamically stable  2/17/2021 1254 by Yecenia Bucio  Outcome: Resolved/Met  2/17/2021 1024 by Yecenia Bucio  Outcome: Progressing Towards Goal  2/17/2021 1023 by Yecenia Bucio  Outcome: Progressing Towards Goal  Goal: *Discharge plan identified  2/17/2021 1254 by Yecenia Bucio  Outcome: Resolved/Met  2/17/2021 1024 by Yecenia Bucio  Outcome: Progressing Towards Goal  2/17/2021 1023 by Yecenia Bucio  Outcome: Progressing Towards Goal     Problem: Knee Replacement: Post-Op Day 2  Goal: Off Pathway (Use only if patient is Off Pathway)  2/17/2021 1254 by Yecenia Bucio  Outcome: Resolved/Met  2/17/2021 1024 by Yecenia Bucio  Outcome: Progressing Towards Goal  2/17/2021 1023 by Yecenia Bucio  Outcome: Progressing Towards Goal  Goal: Activity/Safety  2/17/2021 1254 by Yecenia Bucio  Outcome: Resolved/Met  2/17/2021 1024 by Yecenia Bucio  Outcome: Progressing Towards Goal  2/17/2021 1023 by Yecenia Bucio  Outcome: Progressing Towards Goal  Goal: Diagnostic Test/Procedures  2/17/2021 1254 by Yecenia Bucio  Outcome: Resolved/Met  2/17/2021 1024 by Yecenia Bucio  Outcome: Progressing Towards Goal  2/17/2021 1023 by Yecenia Bucio  Outcome: Progressing Towards Goal  Goal: Medications  2/17/2021 1254 by Yecenia Bucio  Outcome: Resolved/Met  2/17/2021 1024 by Yecenia Bucio  Outcome: Progressing Towards Goal  2/17/2021 1023 by Yecenia Bucio  Outcome: Progressing Towards Goal  Goal: Respiratory  2/17/2021 1254 by Yecenia Bucio  Outcome: Resolved/Met  2/17/2021 1024 by Yecenia Bucio  Outcome: Progressing Towards Goal  2/17/2021 1023 by Yecenia Bucio  Outcome: Progressing Towards Goal  Goal: Treatments/Interventions/Procedures  2/17/2021 1254 by Yecenia Bucio  Outcome: Resolved/Met  2/17/2021 1024 by Yecenia Bucio  Outcome: Progressing Towards Goal  2/17/2021 1023 by Yecenia Bucio  Outcome: Progressing Towards Goal  Goal: Psychosocial  2/17/2021 1254 by Yecenia Bucio  Outcome: Resolved/Met  2/17/2021 1024 by Danielle Vuong, [place]  Outcome: Progressing Towards Goal  2/17/2021 1023 by Miles Tejada  Outcome: Progressing Towards Goal  Goal: *Met physical therapy criteria for discharge to the next level of care  2/17/2021 1254 by Miles Tejada  Outcome: Resolved/Met  2/17/2021 1024 by Miles Tejada  Outcome: Progressing Towards Goal  2/17/2021 1023 by Miles Tejada  Outcome: Progressing Towards Goal  Goal: *Optimal pain control with oral analgesia  2/17/2021 1254 by Miles Tejada  Outcome: Resolved/Met  2/17/2021 1024 by Miles Tejada  Outcome: Progressing Towards Goal  2/17/2021 1023 by Miles Tejada  Outcome: Progressing Towards Goal  Goal: *Hemodynamically stable  2/17/2021 1254 by Miles Tejada  Outcome: Resolved/Met  2/17/2021 1024 by Miles Tejada  Outcome: Progressing Towards Goal  2/17/2021 1023 by Miles Tejada  Outcome: Progressing Towards Goal  Goal: *Tolerating diet  2/17/2021 1254 by Miles Tejada  Outcome: Resolved/Met  2/17/2021 1024 by Miles Tejada  Outcome: Progressing Towards Goal  2/17/2021 1023 by Miles Tejada  Outcome: Progressing Towards Goal  Goal: *Patient verbalizes understanding of discharge instructions  2/17/2021 1254 by Miles Tejada  Outcome: Resolved/Met  2/17/2021 1024 by Miles Tejada  Outcome: Progressing Towards Goal  2/17/2021 1023 by Miles Tejada  Outcome: Progressing Towards Goal     Problem: Falls - Risk of  Goal: *Absence of Falls  Description: Document [place] Fall Risk and appropriate interventions in the flowsheet.   2/17/2021 1254 by Miles Tejada  Outcome: Resolved/Met  2/17/2021 1024 by Miles Tejada  Outcome: Progressing Towards Goal  Note: Fall Risk Interventions:  Mobility Interventions: PT Consult for mobility concerns         Medication Interventions: Patient to call before getting OOB    Elimination Interventions: Call light in reach           2/17/2021 1023 by Miles Tejada  Outcome: Progressing Towards Goal  Note: Fall Risk Interventions:  Mobility Interventions: PT Consult for mobility concerns         Medication Interventions: Patient to call before getting OOB    Elimination Interventions: Call light in reach              Problem: Patient Education: Go to Patient Education Activity  Goal: Patient/Family Education  2/17/2021 1254 by Humberto Carvalho  Outcome: Resolved/Met  2/17/2021 1024 by Humberto Carvalho  Outcome: Progressing Towards Goal  2/17/2021 1023 by Humberto Carvalho  Outcome: Progressing Towards Goal     Problem: Patient Education: Go to Patient Education Activity  Goal: Patient/Family Education  2/17/2021 1254 by Humberto Carvalho  Outcome: Resolved/Met  2/17/2021 1024 by Humberto Carvalho  Outcome: Progressing Towards Goal  2/17/2021 1023 by Humberto Carvalho  Outcome: Progressing Towards Goal     Problem: Pressure Injury - Risk of  Goal: *Prevention of pressure injury  Description: Document Darryl Scale and appropriate interventions in the flowsheet.   2/17/2021 1254 by Humberto Carvalho  Outcome: Resolved/Met  2/17/2021 1024 by Humberto Carvalho  Outcome: Progressing Towards Goal  2/17/2021 1023 by Humbetro Carvalho  Outcome: Progressing Towards Goal     Problem: Patient Education: Go to Patient Education Activity  Goal: Patient/Family Education  2/17/2021 1254 by Humberto Carvalho  Outcome: Resolved/Met  2/17/2021 1024 by Humberto Carvalho  Outcome: Progressing Towards Goal  2/17/2021 1023 by Humberto Carvalho  Outcome: Progressing Towards Goal     Problem: Patient Education: Go to Patient Education Activity  Goal: Patient/Family Education  2/17/2021 1254 by Humberto Carvalho  Outcome: Resolved/Met  2/17/2021 1024 by Humberto Carvalho  Outcome: Progressing Towards Goal  2/17/2021 1023 by Humberto Carvalho  Outcome: Progressing Towards Goal

## 2021-02-17 NOTE — PROGRESS NOTES
Problem: Patient Education: Go to Patient Education Activity  Goal: Patient/Family Education  Outcome: Progressing Towards Goal     Problem: Knee Replacement: Day of Surgery/Unit  Goal: Off Pathway (Use only if patient is Off Pathway)  Outcome: Progressing Towards Goal  Goal: Activity/Safety  Outcome: Progressing Towards Goal  Goal: Consults, if ordered  Outcome: Progressing Towards Goal  Goal: Diagnostic Test/Procedures  Outcome: Progressing Towards Goal  Goal: Nutrition/Diet  Outcome: Progressing Towards Goal  Goal: Medications  Outcome: Progressing Towards Goal  Goal: Respiratory  Outcome: Progressing Towards Goal  Goal: Treatments/Interventions/Procedures  Outcome: Progressing Towards Goal  Goal: Psychosocial  Outcome: Progressing Towards Goal  Goal: *Initiate mobility  Outcome: Progressing Towards Goal  Goal: *Optimal pain control at patient's stated goal  Outcome: Progressing Towards Goal  Goal: *Hemodynamically stable  Outcome: Progressing Towards Goal     Problem: Knee Replacement: Post-Op Day 1  Goal: Off Pathway (Use only if patient is Off Pathway)  Outcome: Progressing Towards Goal  Goal: Activity/Safety  Outcome: Progressing Towards Goal  Goal: Diagnostic Test/Procedures  Outcome: Progressing Towards Goal  Goal: Nutrition/Diet  Outcome: Progressing Towards Goal  Goal: Medications  Outcome: Progressing Towards Goal  Goal: Respiratory  Outcome: Progressing Towards Goal  Goal: Treatments/Interventions/Procedures  Outcome: Progressing Towards Goal  Goal: Psychosocial  Outcome: Progressing Towards Goal  Goal: Discharge Planning  Outcome: Progressing Towards Goal  Goal: *Demonstrates progressive activity  Outcome: Progressing Towards Goal  Goal: *Optimal pain control at patient's stated goal  Outcome: Progressing Towards Goal  Goal: *Hemodynamically stable  Outcome: Progressing Towards Goal  Goal: *Discharge plan identified  Outcome: Progressing Towards Goal     Problem: Knee Replacement: Post-Op Day 2  Goal: Off Pathway (Use only if patient is Off Pathway)  Outcome: Progressing Towards Goal  Goal: Activity/Safety  Outcome: Progressing Towards Goal  Goal: Diagnostic Test/Procedures  Outcome: Progressing Towards Goal  Goal: Medications  Outcome: Progressing Towards Goal  Goal: Respiratory  Outcome: Progressing Towards Goal  Goal: Treatments/Interventions/Procedures  Outcome: Progressing Towards Goal  Goal: Psychosocial  Outcome: Progressing Towards Goal  Goal: *Met physical therapy criteria for discharge to the next level of care  Outcome: Progressing Towards Goal  Goal: *Optimal pain control with oral analgesia  Outcome: Progressing Towards Goal  Goal: *Hemodynamically stable  Outcome: Progressing Towards Goal  Goal: *Tolerating diet  Outcome: Progressing Towards Goal  Goal: *Patient verbalizes understanding of discharge instructions  Outcome: Progressing Towards Goal     Problem: Falls - Risk of  Goal: *Absence of Falls  Description: Document Adria Fall Risk and appropriate interventions in the flowsheet. Outcome: Progressing Towards Goal  Note: Fall Risk Interventions:  Mobility Interventions: PT Consult for mobility concerns         Medication Interventions: Patient to call before getting OOB    Elimination Interventions: Call light in reach              Problem: Patient Education: Go to Patient Education Activity  Goal: Patient/Family Education  Outcome: Progressing Towards Goal     Problem: Patient Education: Go to Patient Education Activity  Goal: Patient/Family Education  Outcome: Progressing Towards Goal     Problem: Pressure Injury - Risk of  Goal: *Prevention of pressure injury  Description: Document Darryl Scale and appropriate interventions in the flowsheet.   Outcome: Progressing Towards Goal     Problem: Patient Education: Go to Patient Education Activity  Goal: Patient/Family Education  Outcome: Progressing Towards Goal     Problem: Patient Education: Go to Patient Education Activity  Goal: Patient/Family Education  Outcome: Progressing Towards Goal

## 2021-02-18 ENCOUNTER — HOME CARE VISIT (OUTPATIENT)
Dept: HOME HEALTH SERVICES | Facility: HOME HEALTH | Age: 76
End: 2021-02-18

## 2021-02-18 ENCOUNTER — HOME CARE VISIT (OUTPATIENT)
Dept: SCHEDULING | Facility: HOME HEALTH | Age: 76
End: 2021-02-18
Payer: MEDICARE

## 2021-02-18 VITALS
OXYGEN SATURATION: 98 % | HEART RATE: 89 BPM | DIASTOLIC BLOOD PRESSURE: 76 MMHG | TEMPERATURE: 97.7 F | SYSTOLIC BLOOD PRESSURE: 134 MMHG

## 2021-02-18 PROCEDURE — 3331090001 HH PPS REVENUE CREDIT

## 2021-02-18 PROCEDURE — 400018 HH-NO PAY CLAIM PROCEDURE

## 2021-02-18 PROCEDURE — 3331090002 HH PPS REVENUE DEBIT

## 2021-02-18 PROCEDURE — G0151 HHCP-SERV OF PT,EA 15 MIN: HCPCS

## 2021-02-18 PROCEDURE — 400013 HH SOC

## 2021-02-19 ENCOUNTER — HOME CARE VISIT (OUTPATIENT)
Dept: SCHEDULING | Facility: HOME HEALTH | Age: 76
End: 2021-02-19
Payer: MEDICARE

## 2021-02-19 VITALS
HEART RATE: 90 BPM | RESPIRATION RATE: 16 BRPM | SYSTOLIC BLOOD PRESSURE: 142 MMHG | TEMPERATURE: 97.7 F | OXYGEN SATURATION: 98 % | DIASTOLIC BLOOD PRESSURE: 80 MMHG

## 2021-02-19 PROCEDURE — G0299 HHS/HOSPICE OF RN EA 15 MIN: HCPCS

## 2021-02-19 PROCEDURE — 3331090002 HH PPS REVENUE DEBIT

## 2021-02-19 PROCEDURE — 3331090001 HH PPS REVENUE CREDIT

## 2021-02-19 PROCEDURE — A6213 FOAM DRG >16<=48 SQ IN W/BDR: HCPCS

## 2021-02-19 PROCEDURE — G0157 HHC PT ASSISTANT EA 15: HCPCS

## 2021-02-20 ENCOUNTER — HOME CARE VISIT (OUTPATIENT)
Dept: SCHEDULING | Facility: HOME HEALTH | Age: 76
End: 2021-02-20
Payer: MEDICARE

## 2021-02-20 PROCEDURE — G0157 HHC PT ASSISTANT EA 15: HCPCS

## 2021-02-20 PROCEDURE — 3331090002 HH PPS REVENUE DEBIT

## 2021-02-20 PROCEDURE — 3331090001 HH PPS REVENUE CREDIT

## 2021-02-21 ENCOUNTER — HOME CARE VISIT (OUTPATIENT)
Dept: SCHEDULING | Facility: HOME HEALTH | Age: 76
End: 2021-02-21
Payer: MEDICARE

## 2021-02-21 PROCEDURE — G0157 HHC PT ASSISTANT EA 15: HCPCS

## 2021-02-21 PROCEDURE — 3331090002 HH PPS REVENUE DEBIT

## 2021-02-21 PROCEDURE — 3331090001 HH PPS REVENUE CREDIT

## 2021-02-22 ENCOUNTER — HOME CARE VISIT (OUTPATIENT)
Dept: SCHEDULING | Facility: HOME HEALTH | Age: 76
End: 2021-02-22
Payer: MEDICARE

## 2021-02-22 VITALS
OXYGEN SATURATION: 97 % | SYSTOLIC BLOOD PRESSURE: 122 MMHG | TEMPERATURE: 97.2 F | OXYGEN SATURATION: 99 % | RESPIRATION RATE: 13 BRPM | HEART RATE: 82 BPM | TEMPERATURE: 98.4 F | HEART RATE: 80 BPM | SYSTOLIC BLOOD PRESSURE: 130 MMHG | DIASTOLIC BLOOD PRESSURE: 70 MMHG | DIASTOLIC BLOOD PRESSURE: 78 MMHG | RESPIRATION RATE: 14 BRPM

## 2021-02-22 VITALS
HEART RATE: 78 BPM | TEMPERATURE: 97.6 F | OXYGEN SATURATION: 98 % | RESPIRATION RATE: 18 BRPM | SYSTOLIC BLOOD PRESSURE: 148 MMHG | DIASTOLIC BLOOD PRESSURE: 78 MMHG

## 2021-02-22 VITALS
TEMPERATURE: 97.3 F | SYSTOLIC BLOOD PRESSURE: 140 MMHG | OXYGEN SATURATION: 99 % | RESPIRATION RATE: 18 BRPM | DIASTOLIC BLOOD PRESSURE: 88 MMHG | HEART RATE: 80 BPM

## 2021-02-22 PROCEDURE — G0157 HHC PT ASSISTANT EA 15: HCPCS

## 2021-02-22 PROCEDURE — G0300 HHS/HOSPICE OF LPN EA 15 MIN: HCPCS

## 2021-02-22 PROCEDURE — 3331090002 HH PPS REVENUE DEBIT

## 2021-02-22 PROCEDURE — 3331090001 HH PPS REVENUE CREDIT

## 2021-02-23 ENCOUNTER — HOME CARE VISIT (OUTPATIENT)
Dept: HOME HEALTH SERVICES | Facility: HOME HEALTH | Age: 76
End: 2021-02-23
Payer: MEDICARE

## 2021-02-23 ENCOUNTER — HOME CARE VISIT (OUTPATIENT)
Dept: SCHEDULING | Facility: HOME HEALTH | Age: 76
End: 2021-02-23
Payer: MEDICARE

## 2021-02-23 VITALS
DIASTOLIC BLOOD PRESSURE: 86 MMHG | SYSTOLIC BLOOD PRESSURE: 132 MMHG | HEART RATE: 53 BPM | OXYGEN SATURATION: 98 % | TEMPERATURE: 98.2 F | HEART RATE: 85 BPM | DIASTOLIC BLOOD PRESSURE: 80 MMHG | OXYGEN SATURATION: 96 % | TEMPERATURE: 98.1 F | RESPIRATION RATE: 16 BRPM | SYSTOLIC BLOOD PRESSURE: 144 MMHG | RESPIRATION RATE: 16 BRPM

## 2021-02-23 PROCEDURE — G0157 HHC PT ASSISTANT EA 15: HCPCS

## 2021-02-23 PROCEDURE — 3331090002 HH PPS REVENUE DEBIT

## 2021-02-23 PROCEDURE — 3331090001 HH PPS REVENUE CREDIT

## 2021-02-23 NOTE — DISCHARGE SUMMARY
DISCHARGE SUMMARY            ADMISSION DIAGNOSIS: Primary osteoarthritis of left knee [M17.12]    DISCHARGE DIAGNOSIS: Status post Primary osteoarthritis of left knee [M17.12]        Subjective: 70 y.o., male, 1 Days Post-Op, Procedure(s):  LEFT TOTAL KNEE ARTHROPLASTY/BIOMET/2 SA'S/ADDUCTOR BLOCK     Admitting date: 16 February 2021    Date of Discharge/Transfer: 17 February 2021    Physical Exam (day of transfer / discharge): 17 February 2021      Condition at Discharge: Stable and cleared to advance to next level of care / rehabilitation. History:  Past Medical History:   Diagnosis Date    Diabetes ([facility] [address]     GERD (gastroesophageal reflux disease)     Hypertension     Impaired fasting glucose February 2010             History of Present Illness:     Mr. Phyllis Herrera is a pleasant male who suffered a well documented radiographic history of end-stage osteoarthritis of the left knee. Phyllis Herrera had failed all conservative measures as directed by Dr. Anuel Solitario, and in light of Phyllis Herrera progressive pain and difficultly safely performing his  necessary Activities of Daily Living, Dr. Kiana Charles recommended a total left knee joint replacement. PAST MEDICAL HISTORY:   Past Medical History:   Diagnosis Date    Diabetes ([facility] [address]     GERD (gastroesophageal reflux disease)     Hypertension     Impaired fasting glucose February 2010       PAST SURGICAL HISTORY:   Past Surgical History:   Procedure Laterality Date    ENDOSCOPY, COLON, DIAGNOSTIC  2006    MD ABDOMEN SURGERY PROC UNLISTED  1968    secondary to injury       ALLERGIES:   Allergies   Allergen Reactions    Omeprazole Hives, Swelling and Angioedema        CURRENT MEDICATIONS:  A list of medications prior to the time of admission include:  Prior to Admission medications    Medication Sig Start Date End Date Taking?  Authorizing Provider   oxyCODONE IR (ROXICODONE) 5 mg immediate release tablet Take 1 Tab by mouth every four (4) hours as needed for Pain for up to 14 days. Max Daily Amount: 30 mg. 2/17/21 3/3/21 Yes Braden Gallagher PA-C   rivaroxaban (XARELTO) 10 mg tablet Take 1 Tab by mouth daily (with breakfast) for 5 days. 2/18/21 2/23/21 Yes Braden Gallagher PA-C   senna-docusate (PERICOLACE) 8.6-50 mg per tablet Take 1 Tab by mouth daily. 2/17/21  Yes Mansoor Fernandez PA-C   metFORMIN (GLUCOPHAGE) 1,000 mg tablet Take 1,000 mg by mouth two (2) times daily (with meals). Yes Provider, Historical   atorvastatin (LIPITOR) 40 mg tablet Take 40 mg by mouth daily. Yes Provider, Historical   hydroCHLOROthiazide (HYDRODIURIL) 25 mg tablet Take 1 Tab by mouth daily. 3/6/20  Yes Hannah Silverman MD   pantoprazole (PROTONIX) 40 mg tablet Take 1 Tab by mouth daily. 3/6/20  Yes Hannah Silverman MD   acetaminophen (Tylenol Arthritis Pain) 650 mg TbER Take 650 mg by mouth every six (6) hours as needed for Pain. Provider, Historical       FAMILY HISTORY:   Family History   Problem Relation Age of Onset    Stroke Mother     Heart Attack Father        SOCIAL HISTORY:   Social History     Socioeconomic History    Marital status:      Spouse name: Not on file    Number of children: Not on file    Years of education: Not on file    Highest education level: Not on file   Tobacco Use    Smoking status: Never Smoker    Smokeless tobacco: Never Used   Substance and Sexual Activity    Alcohol use: No    Drug use: No       REVIEW OF SYSTEMS: All review of systems are negative. Social History     Occupational History    Not on file   Tobacco Use    Smoking status: Never Smoker    Smokeless tobacco: Never Used   Substance and Sexual Activity    Alcohol use: No    Drug use: No    Sexual activity: Not on file       Hospital Course:     Mr. Bharathi Montelongo was admitted to [facility] on the morning of 16 February 2021 under the care of Dr. Vee Gallagher.  he was taken to the operating theater under Dr. Georgi Drew direction, first assisted by trained surgical assistant's where he underwent a left knee total joint replacement. he tolerated the procedure well, and there were no intra operative complications. Post operatively he was transferred medically stable to post op holding. After all safety criteria had been met during his immediate post op recovery phase he was transferred medical stable to 2 surgical to begin comprehensive physical and occupational therapies, restorative nursing and thrombo embolism (DVT/PE) prophylaxis. Mr. Maverick Aguirre post operative course progressed slow but directed. The focus throughout the post op period focused on range of motion and pain control. An acute post operative blood loss anemia was noted post operatively and there was no need to transfuse packed red blood cells. Medically he  remained stable through the remainder of the hospital stay. In light of the slow gains attained by Mr. Maverick Aguirre post operatively he is being recommended for a directed course of comprehensive PT / OT at home      DISCHARGE PLAN:      The patient will be discharged to  home. DIET:  Low Calorie High Protein Diet    NUTRITION: OTC Nutritional supplements, multivitamins      ACTIVITY: No lifting, Driving, or Strenuous exercise and No heavy lifting, pushing, pulling. Weight Bearing/Range of Motion Restrictions: Per Protocol    WOUND / INCISION CARE: Keep wound clean and dry, Reinforce dressing PRN and Ice to area for comfort Keep the current dressings on and in place. There is no need to change these current dressings. Dressings to please be changed by home nurse or nursing home staff each day. Keep all pets away from any wound present in order to prevent infection. PAIN CONTROL: Prescriptions written:  On chart    PRECAUTIONS: Weight Bearing/Range of Motion per Restrictions: See Below    VTE PROPHYLAXIS: Xarelto 10 mg p.o. daily beginning on 18 February 2021 for 5 days then discontinuing and beginning aspirin 81 mg p.o. thereafter    ANTIBIOTICS: None at this time. MEDICATIONS AT DISCHARGE:  Cannot display discharge medications since this patient is not currently admitted. Physical and Occupational therapies:    will continue with attention to standard postop precautions / restrictions and below:    [ ] Denice Hoop [ ] RLE  [ ] LUE  [XX] LLE    [XX] Full WBAT   [ ] Partial WBAT   [ ] Toetouch WB   [ ] Non Weight Bearing: Follow up:    [ ] 1 week  [XX] 10 days  [ ] Specific Date:       Per [facility] Protocol this  case was discussed with attending surgeon and reflects their orders as confirmed by their co signature.      Very Respectfully,        Mario Jenkins APA, APC, MPAS PA-C  Surical Physician Assistant-C  Department of [address] and [facility]   [address]  Contact Cell [phone]

## 2021-02-24 ENCOUNTER — TELEPHONE (OUTPATIENT)
Dept: ORTHOPEDIC SURGERY | Age: 76
End: 2021-02-24

## 2021-02-24 ENCOUNTER — HOME CARE VISIT (OUTPATIENT)
Dept: SCHEDULING | Facility: HOME HEALTH | Age: 76
End: 2021-02-24
Payer: MEDICARE

## 2021-02-24 VITALS
RESPIRATION RATE: 16 BRPM | SYSTOLIC BLOOD PRESSURE: 130 MMHG | HEART RATE: 78 BPM | DIASTOLIC BLOOD PRESSURE: 88 MMHG | TEMPERATURE: 97.5 F | OXYGEN SATURATION: 98 %

## 2021-02-24 PROCEDURE — G0157 HHC PT ASSISTANT EA 15: HCPCS

## 2021-02-24 PROCEDURE — 3331090002 HH PPS REVENUE DEBIT

## 2021-02-24 PROCEDURE — 3331090001 HH PPS REVENUE CREDIT

## 2021-02-24 NOTE — TELEPHONE ENCOUNTER
Keny Canchola from [facility] called to give an update on the patient. He has a \"4 by 4 red area around the medial section of his knee\". He says he tried heat and ice on it yesterday. He has no fever and no increase in pain in that area. Please advise patient back if needed or Keny Canchola at [phone] if needed.

## 2021-02-25 ENCOUNTER — OFFICE VISIT (OUTPATIENT)
Dept: ORTHOPEDIC SURGERY | Age: 76
End: 2021-02-25
Payer: MEDICARE

## 2021-02-25 VITALS — TEMPERATURE: 96.8 F

## 2021-02-25 DIAGNOSIS — G89.18 ACUTE POST-OPERATIVE PAIN: ICD-10-CM

## 2021-02-25 DIAGNOSIS — Z48.89 ENCOUNTER FOR POSTOPERATIVE WOUND CHECK: ICD-10-CM

## 2021-02-25 DIAGNOSIS — M25.9 REDNESS OF JOINT: ICD-10-CM

## 2021-02-25 DIAGNOSIS — G89.18 POSTOPERATIVE PAIN: Primary | ICD-10-CM

## 2021-02-25 DIAGNOSIS — Z48.02: ICD-10-CM

## 2021-02-25 PROCEDURE — G8756 NO BP MEASURE DOC: HCPCS | Performed by: PHYSICIAN ASSISTANT

## 2021-02-25 PROCEDURE — G8432 DEP SCR NOT DOC, RNG: HCPCS | Performed by: PHYSICIAN ASSISTANT

## 2021-02-25 PROCEDURE — G8427 DOCREV CUR MEDS BY ELIG CLIN: HCPCS | Performed by: PHYSICIAN ASSISTANT

## 2021-02-25 PROCEDURE — 3331090002 HH PPS REVENUE DEBIT

## 2021-02-25 PROCEDURE — G8536 NO DOC ELDER MAL SCRN: HCPCS | Performed by: PHYSICIAN ASSISTANT

## 2021-02-25 PROCEDURE — 3331090001 HH PPS REVENUE CREDIT

## 2021-02-25 PROCEDURE — 1101F PT FALLS ASSESS-DOCD LE1/YR: CPT | Performed by: PHYSICIAN ASSISTANT

## 2021-02-25 PROCEDURE — 99024 POSTOP FOLLOW-UP VISIT: CPT | Performed by: PHYSICIAN ASSISTANT

## 2021-02-25 PROCEDURE — 1111F DSCHRG MED/CURRENT MED MERGE: CPT | Performed by: PHYSICIAN ASSISTANT

## 2021-02-25 PROCEDURE — G8419 CALC BMI OUT NRM PARAM NOF/U: HCPCS | Performed by: PHYSICIAN ASSISTANT

## 2021-02-25 PROCEDURE — 3017F COLORECTAL CA SCREEN DOC REV: CPT | Performed by: PHYSICIAN ASSISTANT

## 2021-02-25 RX ORDER — OXYCODONE HYDROCHLORIDE 5 MG/1
5 TABLET ORAL
Qty: 48 TAB | Refills: 0 | Status: SHIPPED | OUTPATIENT
Start: 2021-02-25 | End: 2021-03-11

## 2021-02-25 RX ORDER — SULFAMETHOXAZOLE AND TRIMETHOPRIM 800; 160 MG/1; MG/1
1 TABLET ORAL 2 TIMES DAILY
Qty: 14 TAB | Refills: 0 | Status: SHIPPED | OUTPATIENT
Start: 2021-02-25

## 2021-02-25 NOTE — PROGRESS NOTES
Jackie Torres returns to the office 9 days status post his primary left total knee replacement. Doing well today. Was concerned by the physical therapist who was treating him at his home that he may have some small area of redness over the inner portion of his left knee. Phone call was placed to our office and the patient was recommended to return for examination on 25 February 2021. Patient denies any fever chills or night sweats. His pain is fairly well managed with his opiate analgesic. He request a refill today. He is working on his range of motion but is concerned that if he is not doing as well as he should be doing. Over the anterior portion of the left knee in a cranial/caudal orientation there is a intact healing surgical incision measuring 15 cm [id number]. Over the medial aspect of the central portion of the incision there is a small area measuring about 2 cm AP by 3 cm craniocaudal that is slightly red to the touch with blanching. No evidence of alessandro skin breakdown however. No surrounding blisters. Skin is soft bracketing of the surgical site with no indurations. With patient sitting at bedside he lacks 10 degrees to full extension and can flex to 85 degrees. Lateral portion of the left knee reveals dullness to light touch consistent with the innervated area of the infrapatellar branch of the saphenous nerve. This is unchanged from just following surgery. Procedural: Using clean technique all staples removed today with no complications. Steri-Strips placed with a sterile top-cover. Plan: Patient is going to follow with our office in 7 days. He may get the surgical site wet but avoid any soaking. The Steri-Strips are to be left in place. A sterile top-cover was placed today which he may remove for shower. Continue home PT per protocol.   He was called in his pain medication refill as well as Septra DS 1 p.o. twice daily for 7 days for empiric treatment of possible early cellulitis affecting the medial portion of the knee. Today all of venous ice questions were answered to their satisfaction. In addition the area was scribed with a methylene blue marker if patient notes extension of the faint redness extending beyond the site of line to theline and then he should call this office immediately for reevaluation of after hours proceed to the emergency room.

## 2021-02-26 ENCOUNTER — HOME CARE VISIT (OUTPATIENT)
Dept: HOME HEALTH SERVICES | Facility: HOME HEALTH | Age: 76
End: 2021-02-26
Payer: MEDICARE

## 2021-02-26 PROCEDURE — 3331090001 HH PPS REVENUE CREDIT

## 2021-02-26 PROCEDURE — 3331090002 HH PPS REVENUE DEBIT

## 2021-02-26 PROCEDURE — G0157 HHC PT ASSISTANT EA 15: HCPCS

## 2021-02-27 PROCEDURE — 3331090001 HH PPS REVENUE CREDIT

## 2021-02-27 PROCEDURE — 3331090002 HH PPS REVENUE DEBIT

## 2021-02-28 PROCEDURE — 3331090001 HH PPS REVENUE CREDIT

## 2021-02-28 PROCEDURE — 3331090002 HH PPS REVENUE DEBIT

## 2021-03-01 ENCOUNTER — HOME CARE VISIT (OUTPATIENT)
Dept: SCHEDULING | Facility: HOME HEALTH | Age: 76
End: 2021-03-01
Payer: MEDICARE

## 2021-03-01 VITALS
TEMPERATURE: 97.7 F | HEART RATE: 83 BPM | RESPIRATION RATE: 17 BRPM | DIASTOLIC BLOOD PRESSURE: 82 MMHG | OXYGEN SATURATION: 97 % | SYSTOLIC BLOOD PRESSURE: 128 MMHG

## 2021-03-01 VITALS — HEART RATE: 83 BPM | OXYGEN SATURATION: 97 % | RESPIRATION RATE: 16 BRPM | TEMPERATURE: 97.3 F

## 2021-03-01 PROCEDURE — G0157 HHC PT ASSISTANT EA 15: HCPCS

## 2021-03-01 PROCEDURE — G0300 HHS/HOSPICE OF LPN EA 15 MIN: HCPCS

## 2021-03-01 PROCEDURE — 3331090002 HH PPS REVENUE DEBIT

## 2021-03-01 PROCEDURE — 3331090001 HH PPS REVENUE CREDIT

## 2021-03-02 PROCEDURE — 3331090001 HH PPS REVENUE CREDIT

## 2021-03-02 PROCEDURE — 3331090002 HH PPS REVENUE DEBIT

## 2021-03-03 ENCOUNTER — HOME CARE VISIT (OUTPATIENT)
Dept: SCHEDULING | Facility: HOME HEALTH | Age: 76
End: 2021-03-03
Payer: MEDICARE

## 2021-03-03 ENCOUNTER — OFFICE VISIT (OUTPATIENT)
Dept: ORTHOPEDIC SURGERY | Age: 76
End: 2021-03-03
Payer: MEDICARE

## 2021-03-03 VITALS
RESPIRATION RATE: 20 BRPM | HEART RATE: 83 BPM | SYSTOLIC BLOOD PRESSURE: 128 MMHG | TEMPERATURE: 98.3 F | DIASTOLIC BLOOD PRESSURE: 82 MMHG | OXYGEN SATURATION: 97 %

## 2021-03-03 DIAGNOSIS — Z96.652 STATUS POST LEFT KNEE REPLACEMENT: Primary | ICD-10-CM

## 2021-03-03 PROCEDURE — 3331090002 HH PPS REVENUE DEBIT

## 2021-03-03 PROCEDURE — 1101F PT FALLS ASSESS-DOCD LE1/YR: CPT | Performed by: PHYSICIAN ASSISTANT

## 2021-03-03 PROCEDURE — G8419 CALC BMI OUT NRM PARAM NOF/U: HCPCS | Performed by: PHYSICIAN ASSISTANT

## 2021-03-03 PROCEDURE — G8432 DEP SCR NOT DOC, RNG: HCPCS | Performed by: PHYSICIAN ASSISTANT

## 2021-03-03 PROCEDURE — G8756 NO BP MEASURE DOC: HCPCS | Performed by: PHYSICIAN ASSISTANT

## 2021-03-03 PROCEDURE — 99024 POSTOP FOLLOW-UP VISIT: CPT | Performed by: PHYSICIAN ASSISTANT

## 2021-03-03 PROCEDURE — G8536 NO DOC ELDER MAL SCRN: HCPCS | Performed by: PHYSICIAN ASSISTANT

## 2021-03-03 PROCEDURE — G8428 CUR MEDS NOT DOCUMENT: HCPCS | Performed by: PHYSICIAN ASSISTANT

## 2021-03-03 PROCEDURE — 3331090001 HH PPS REVENUE CREDIT

## 2021-03-03 PROCEDURE — 3017F COLORECTAL CA SCREEN DOC REV: CPT | Performed by: PHYSICIAN ASSISTANT

## 2021-03-03 PROCEDURE — G0157 HHC PT ASSISTANT EA 15: HCPCS

## 2021-03-03 PROCEDURE — 1111F DSCHRG MED/CURRENT MED MERGE: CPT | Performed by: PHYSICIAN ASSISTANT

## 2021-03-03 NOTE — PROGRESS NOTES
Elijah Bae returns the office status post his primary left total knee replacement. He was last seen for the suspicion of early cellulitis associated with his recent surgery affecting the medial aspect of his knee. He was placed empirically on Septra DS one p.o. twice daily which he has completed. He is doing well today. He notes improvement in the redness. His physical therapy is continued at home. Exam of the anterior surface of the left knee cranial caudal oriented reveals an intact healing surgical incision with Steri-Strips noted. All removed to reveal scattered dried bloody discharge with no evidence of wound dehiscence or infection. Patient sitting on exam table he lacks three finger breaths to full extension. While supine he flexes to 80 degrees. There is a noted trace effusion over the medial aspect of the left knee. No instability on varus or valgus stressing. Area associated with the prior early erythema improved with excellent capillary refill, no indurations and no fluctuance noted. Plan: Patient to continue to advance to outpatient physical therapy services per protocol. Continue full weightbearing of the left lower extremity. Terminal flexion and extension exercises demonstrated today. Patient to follow in 2 weeks with x-ray. Today all of his questions answered to his satisfaction.

## 2021-03-04 PROCEDURE — 3331090001 HH PPS REVENUE CREDIT

## 2021-03-04 PROCEDURE — 3331090002 HH PPS REVENUE DEBIT

## 2021-03-05 ENCOUNTER — HOME CARE VISIT (OUTPATIENT)
Dept: SCHEDULING | Facility: HOME HEALTH | Age: 76
End: 2021-03-05
Payer: MEDICARE

## 2021-03-05 PROCEDURE — 3331090002 HH PPS REVENUE DEBIT

## 2021-03-05 PROCEDURE — 3331090001 HH PPS REVENUE CREDIT

## 2021-03-05 PROCEDURE — G0151 HHCP-SERV OF PT,EA 15 MIN: HCPCS

## 2021-03-06 VITALS
DIASTOLIC BLOOD PRESSURE: 82 MMHG | HEART RATE: 82 BPM | TEMPERATURE: 98.4 F | OXYGEN SATURATION: 98 % | SYSTOLIC BLOOD PRESSURE: 140 MMHG

## 2021-03-06 PROCEDURE — 3331090001 HH PPS REVENUE CREDIT

## 2021-03-06 PROCEDURE — 3331090002 HH PPS REVENUE DEBIT

## 2021-03-07 PROCEDURE — 3331090001 HH PPS REVENUE CREDIT

## 2021-03-07 PROCEDURE — 3331090002 HH PPS REVENUE DEBIT

## 2021-03-08 ENCOUNTER — TELEPHONE (OUTPATIENT)
Dept: SURGICAL ICU | Age: 76
End: 2021-03-08

## 2021-03-12 ENCOUNTER — TELEPHONE (OUTPATIENT)
Dept: ORTHOPEDIC SURGERY | Age: 76
End: 2021-03-12

## 2021-03-12 NOTE — TELEPHONE ENCOUNTER
[phone] Patient    Patient is asking if it is ok for him to drive as he will be alone. Please advise if this is alright.

## 2021-03-12 NOTE — TELEPHONE ENCOUNTER
Please advise patient that driving is a legal decision. If he chooses to then that is his choice and any accidents or injuries that are a result would be financially his concern. No clearance to drive can be provided by the orthopedic office. Normal driving following a knee replacement is a minimum of 6 to 8 weeks from surgery.

## 2021-03-17 ENCOUNTER — OFFICE VISIT (OUTPATIENT)
Dept: ORTHOPEDIC SURGERY | Age: 76
End: 2021-03-17
Payer: MEDICARE

## 2021-03-17 VITALS
SYSTOLIC BLOOD PRESSURE: 131 MMHG | HEIGHT: 67 IN | BODY MASS INDEX: 26.37 KG/M2 | DIASTOLIC BLOOD PRESSURE: 79 MMHG | RESPIRATION RATE: 16 BRPM | WEIGHT: 168 LBS | HEART RATE: 84 BPM | TEMPERATURE: 97.1 F

## 2021-03-17 DIAGNOSIS — M70.62 GREATER TROCHANTERIC BURSITIS OF LEFT HIP: ICD-10-CM

## 2021-03-17 DIAGNOSIS — M25.552 LEFT HIP PAIN: ICD-10-CM

## 2021-03-17 DIAGNOSIS — Z96.652 STATUS POST LEFT KNEE REPLACEMENT: Primary | ICD-10-CM

## 2021-03-17 PROCEDURE — 73560 X-RAY EXAM OF KNEE 1 OR 2: CPT | Performed by: PHYSICIAN ASSISTANT

## 2021-03-17 PROCEDURE — 99214 OFFICE O/P EST MOD 30 MIN: CPT | Performed by: PHYSICIAN ASSISTANT

## 2021-03-17 PROCEDURE — 20610 DRAIN/INJ JOINT/BURSA W/O US: CPT | Performed by: PHYSICIAN ASSISTANT

## 2021-03-17 PROCEDURE — 99024 POSTOP FOLLOW-UP VISIT: CPT | Performed by: PHYSICIAN ASSISTANT

## 2021-03-17 PROCEDURE — 73502 X-RAY EXAM HIP UNI 2-3 VIEWS: CPT | Performed by: PHYSICIAN ASSISTANT

## 2021-03-17 RX ORDER — TRIAMCINOLONE ACETONIDE 40 MG/ML
40 INJECTION, SUSPENSION INTRA-ARTICULAR; INTRAMUSCULAR ONCE
Status: COMPLETED | OUTPATIENT
Start: 2021-03-17 | End: 2021-03-17

## 2021-03-17 RX ADMIN — TRIAMCINOLONE ACETONIDE 40 MG: 40 INJECTION, SUSPENSION INTRA-ARTICULAR; INTRAMUSCULAR at 11:00

## 2021-03-17 RX ADMIN — TRIAMCINOLONE ACETONIDE 40 MG: 40 INJECTION, SUSPENSION INTRA-ARTICULAR; INTRAMUSCULAR at 12:52

## 2021-03-17 NOTE — PROGRESS NOTES
Rickey Catalan returns the office 4 weeks status post his primary left total knee replacement. He was last seen for the suspicion of early cellulitis associated with his recent surgery affecting the medial aspect of his knee. He was placed empirically on Septra DS one p.o. twice daily which he has completed. He is doing well today. He notes improvement in the redness. His physical therapy is continued at outpatient    Exam of the anterior surface of the left knee cranial caudal oriented reveals an intact healing surgical incision with Steri-Strips noted. All removed to reveal scattered dried bloody discharge with no evidence of wound dehiscence or infection. Patient sitting on exam table he lacks three finger breaths to full extension. While supine he flexes to 80 degrees. There is a noted trace effusion over the medial aspect of the left knee. No instability on varus or valgus stressing. Area associated with the prior early erythema improved with excellent capillary refill, no indurations and no fluctuance noted. Patient has developed pain in his left hip radiating down the left outer leg to just above the knee. He is unsure of the cause of the pain but notes its limiting his ability to participate in outpatient physical therapy services. Pain at rest associated with the left hip is dull aching characteristic carrying a pain rating of 2-3 on a 10 point scale. With activity to include standing from a seated position sitting from a standing position walking short distances and attempting stairs pain increases in the hip to upwards of a 7 8 on 10 point scale. He has trouble laying on his left side. No history of trauma to his left hip. X-ray: [facility] 3/17/2021 space 2 view of the left knee reveals total joint prosthesis intact with no evidence of periprosthetic fracture or dislocation. Alignment is anatomical.  No soft tissue calcifications identified.   Further AP pelvis and crosstable lateral of the left hip reflects inferior and superior rim acetabular spurring as well as narrowing of the femoral acetabular joint space in the inferior margin of the rim of the acetabulum. No soft tissue calcifications identified. Impression:  1.  1 month status post left primary total knee replacement improving slowly  2. Left hip trochanteric bursitis            Plan: Patient to continue to advance to outpatient physical therapy services per protocol. Continue full weightbearing of the left lower extremity. Terminal flexion and extension exercises demonstrated today. Patient to follow in 2 weeks with x-ray. Today all of his questions answered to his satisfaction. Regarding his trochanteric bursal pain I am recommending a low-dose cortisone injection today. Procedural: Using sterile technique and verbal and written consent were obtained appropriate timeout performed patient laying right recumbent left hip draped appropriately to expose skin over the greater trochanteric region and is him joined by Gallito Prater, the point of maximal tenderness consistent with greater trochanteric region was identified and 1 cc of Kenalog 40 mg/mL mixed with 7 mils of Sensorcaine 0.75% instilled. There were no complications. Patient tolerated the procedure well.

## 2021-05-24 ENCOUNTER — OFFICE VISIT (OUTPATIENT)
Dept: ORTHOPEDIC SURGERY | Age: 76
End: 2021-05-24
Payer: MEDICARE

## 2021-05-24 VITALS
BODY MASS INDEX: 27.31 KG/M2 | HEART RATE: 76 BPM | WEIGHT: 174 LBS | RESPIRATION RATE: 16 BRPM | OXYGEN SATURATION: 98 % | HEIGHT: 67 IN

## 2021-05-24 DIAGNOSIS — M12.812 ROTATOR CUFF ARTHROPATHY OF LEFT SHOULDER: Primary | ICD-10-CM

## 2021-05-24 DIAGNOSIS — S43.402A SPRAIN OF LEFT SHOULDER, UNSPECIFIED SHOULDER SPRAIN TYPE, INITIAL ENCOUNTER: ICD-10-CM

## 2021-05-24 PROCEDURE — G8427 DOCREV CUR MEDS BY ELIG CLIN: HCPCS | Performed by: ORTHOPAEDIC SURGERY

## 2021-05-24 PROCEDURE — G8419 CALC BMI OUT NRM PARAM NOF/U: HCPCS | Performed by: ORTHOPAEDIC SURGERY

## 2021-05-24 PROCEDURE — G8756 NO BP MEASURE DOC: HCPCS | Performed by: ORTHOPAEDIC SURGERY

## 2021-05-24 PROCEDURE — G8432 DEP SCR NOT DOC, RNG: HCPCS | Performed by: ORTHOPAEDIC SURGERY

## 2021-05-24 PROCEDURE — 3017F COLORECTAL CA SCREEN DOC REV: CPT | Performed by: ORTHOPAEDIC SURGERY

## 2021-05-24 PROCEDURE — 1101F PT FALLS ASSESS-DOCD LE1/YR: CPT | Performed by: ORTHOPAEDIC SURGERY

## 2021-05-24 PROCEDURE — 99203 OFFICE O/P NEW LOW 30 MIN: CPT | Performed by: ORTHOPAEDIC SURGERY

## 2021-05-24 PROCEDURE — G8536 NO DOC ELDER MAL SCRN: HCPCS | Performed by: ORTHOPAEDIC SURGERY

## 2021-05-24 NOTE — PROGRESS NOTES
Lobo Interiano  1945   Chief Complaint   Patient presents with    Shoulder Pain     left shoulder        HISTORY OF PRESENT ILLNESS  Lobo Interiano is a 70 y.o. male who presents today for evaluation of left shoulder pain. He rates his pain 6/10 today. Pain has been present since 5/19/2021 after falling off of a ladder. Could not lift arm initially but this has improved. Pain has improved since initial injury. Notes swelling and bruising in the arm. Patient describes the pain as aching that is Constant in nature. Symptoms are worse with bending and stretching, Activity and is better with  Rest. Associated symptoms include Swelling. Since problem started, it: has improved. Pain does not wake patient up at night. Has taken Ibuprofen for the problem. Has tried following treatments: Injections:NO; Brace:NO; Therapy:NO; Cane/Crutch:NO       Allergies   Allergen Reactions    Omeprazole Hives, Swelling and Angioedema        Past Medical History:   Diagnosis Date    Diabetes ([facility] [address]     GERD (gastroesophageal reflux disease)     Hypertension     Impaired fasting glucose February 2010      Social History     Socioeconomic History    Marital status:      Spouse name: Not on file    Number of children: Not on file    Years of education: Not on file    Highest education level: Not on file   Occupational History    Not on file   Tobacco Use    Smoking status: Never Smoker    Smokeless tobacco: Never Used   Substance and Sexual Activity    Alcohol use: No    Drug use: No    Sexual activity: Not on file   Other Topics Concern    Not on file   Social History Narrative    Not on file     Social Determinants of Health     Financial Resource Strain:     Difficulty of Paying Living Expenses:    Food Insecurity:     Worried About [address] in the Last Year:     [address] in the Last Year:    Transportation Needs:     Lack of Transportation (Medical):      Lack of Transportation (Non-Medical):    Physical Activity:     Days of Exercise per Week:     Minutes of Exercise per Session:    Stress:     Feeling of Stress :    Social Connections:     Frequency of Communication with Friends and Family:     Frequency of Social Gatherings with Friends and Family:     Attends Alevism Services:     Active Member of Clubs or Organizations:     Attends Club or Organization Meetings:     Marital Status:    Intimate Partner Violence:     Fear of Current or Ex-Partner:     Emotionally Abused:     Physically Abused:     Sexually Abused:       Past Surgical History:   Procedure Laterality Date    ENDOSCOPY, COLON, DIAGNOSTIC  2006    HX KNEE REPLACEMENT      left knee 2021    MT ABDOMEN SURGERY [address]    secondary to injury      Family History   Problem Relation Age of Onset    Stroke Mother     Heart Attack Father       Current Outpatient Medications   Medication Sig    trimethoprim-sulfamethoxazole (BACTRIM DS, SEPTRA DS) 160-800 mg per tablet Take 1 Tab by mouth two (2) times a day.  acetaminophen (Tylenol Arthritis Pain) 650 mg TbER Take 650 mg by mouth every six (6) hours as needed for Pain.  senna-docusate (PERICOLACE) 8.6-50 mg per tablet Take 1 Tab by mouth daily.  metFORMIN (GLUCOPHAGE) 1,000 mg tablet Take 1,000 mg by mouth two (2) times daily (with meals).  atorvastatin (LIPITOR) 40 mg tablet Take 40 mg by mouth daily.  hydroCHLOROthiazide (HYDRODIURIL) 25 mg tablet Take 1 Tab by mouth daily.  pantoprazole (PROTONIX) 40 mg tablet Take 1 Tab by mouth daily. No current facility-administered medications for this visit. REVIEW OF SYSTEM   Patient denies: Weight loss, Fever/Chills, HA, Visual changes, Fatigue, Chest pain, SOB, Abdominal pain, N/V/D/C, Blood in stool or urine, Edema. Pertinent positive as above in HPI.  All others were negative    PHYSICAL EXAM:   Visit Vitals  Pulse 76   Resp 16   Ht 5' 7\" (1.702 m)   Wt 174 lb (78.9 kg) SpO2 98%   BMI 27.25 kg/m²     The patient is a well-developed, well-nourished male   in no acute distress. The patient is alert and oriented times three. The patient is alert and oriented times three. Mood and affect are normal.  LYMPHATIC: lymph nodes are not enlarged and are within normal limits  SKIN: normal in color and non tender to palpation. There are no bruises or abrasions noted. NEUROLOGICAL: Motor sensory exam is within normal limits. Reflexes are equal bilaterally. There is normal sensation to pinprick and light touch  MUSCULOSKELETAL:   Examination Left shoulder   Skin Intact   AC joint tenderness -   Biceps tenderness -   Forward flexion/Elevation    Active abduction    Glenohumeral abduction 80   External rotation ROM 30   Internal rotation ROM 30   Apprehension -   Daniis Relocation -   Jerk -   Load and Shift -   Obriens -   Speeds -   Impingement sign +   Supraspinatus/Empty Can +   External Rotation Strength -, 5/5   Lift Off/Belly Press -, 5/5   Neurovascular Intact        IMAGING: XR of left shoulder with 5 views from [facility] dated 5/19/2021 was reviewed and read by Dr. Sims Credit: Proximal migration of the humeral head with sclerotic changes in the greater tuberosity. IMPRESSION:      ICD-10-CM ICD-9-CM    1. Rotator cuff arthropathy of left shoulder  M12.812 716.81 REFERRAL TO PHYSICAL THERAPY   2. Sprain of left shoulder, unspecified shoulder sprain type, initial encounter  S43.402A 840.9 REFERRAL TO PHYSICAL THERAPY        PLAN:  1. Pt presents today with left shoulder pain due to cuff tear arthropathy and a shoulder sprain and I would like for him to begin PT for the shoulder. Was offered prescription for Mobic but he does not feel the pain is severe enough for a prescription at this time. Will see him back in 2 weeks. Risk factors include: dm, htn  2. No ultrasound exam indicated today  3. No cortisone injection indicated today   4.  Yes Physical/Occupational Therapy indicated today  5. No diagnostic test indicated today:   6. No durable medical equipment indicated today  7. No referral indicated today   8. No medications indicated today:   9. No Narcotic indicated today       RTC 2 weeks      Scribed by Reny Corbin[address] [place] [address] [address]) as dictated by Sun Tripathi MD    I, Dr. Sun Tripathi, confirm that all documentation is accurate.     Sun Tripathi M.D.   [facility]

## 2021-07-16 ENCOUNTER — TELEPHONE (OUTPATIENT)
Dept: ORTHOPEDIC SURGERY | Age: 76
End: 2021-07-16

## 2021-07-16 NOTE — TELEPHONE ENCOUNTER
[facility] called stating the patient is being seen at 10 AM today but they need to know the pre-med protocol as soon as possible. They want to know if the patient can take Amoxicillin 1 hour prior to the appointment. The office is asking that we fax the protocol to fax number [phone]. Please advise.

## 2022-03-19 PROBLEM — M17.12 PRIMARY OSTEOARTHRITIS OF LEFT KNEE: Status: ACTIVE | Noted: 2021-02-16

## 2022-03-19 PROBLEM — K21.9 GASTROESOPHAGEAL REFLUX DISEASE WITHOUT ESOPHAGITIS: Status: ACTIVE | Noted: 2019-09-04

## 2022-03-19 PROBLEM — E66.9 OBESITY (BMI 30-39.9): Status: ACTIVE | Noted: 2019-09-04

## 2024-09-19 ENCOUNTER — OFFICE VISIT (OUTPATIENT)
Age: 79
End: 2024-09-19
Payer: MEDICARE

## 2024-09-19 VITALS — BODY MASS INDEX: 27.62 KG/M2 | TEMPERATURE: 97.6 F | HEIGHT: 67 IN | WEIGHT: 176 LBS

## 2024-09-19 DIAGNOSIS — M17.12 PRIMARY OSTEOARTHRITIS OF LEFT KNEE: Primary | ICD-10-CM

## 2024-09-19 DIAGNOSIS — Z96.652 PRESENCE OF TOTAL KNEE JOINT PROSTHESIS, LEFT: ICD-10-CM

## 2024-09-19 DIAGNOSIS — M76.32 ILIOTIBIAL BAND TENDONITIS OF LEFT SIDE: ICD-10-CM

## 2024-09-19 PROCEDURE — 1036F TOBACCO NON-USER: CPT | Performed by: SPECIALIST

## 2024-09-19 PROCEDURE — 1123F ACP DISCUSS/DSCN MKR DOCD: CPT | Performed by: SPECIALIST

## 2024-09-19 PROCEDURE — G8419 CALC BMI OUT NRM PARAM NOF/U: HCPCS | Performed by: SPECIALIST

## 2024-09-19 PROCEDURE — 99203 OFFICE O/P NEW LOW 30 MIN: CPT | Performed by: SPECIALIST

## 2024-09-19 PROCEDURE — 73560 X-RAY EXAM OF KNEE 1 OR 2: CPT | Performed by: SPECIALIST

## 2024-09-19 PROCEDURE — G8427 DOCREV CUR MEDS BY ELIG CLIN: HCPCS | Performed by: SPECIALIST

## 2024-09-19 SDOH — HEALTH STABILITY: PHYSICAL HEALTH: ON AVERAGE, HOW MANY DAYS PER WEEK DO YOU ENGAGE IN MODERATE TO STRENUOUS EXERCISE (LIKE A BRISK WALK)?: 5 DAYS

## 2024-09-19 SDOH — HEALTH STABILITY: PHYSICAL HEALTH: ON AVERAGE, HOW MANY MINUTES DO YOU ENGAGE IN EXERCISE AT THIS LEVEL?: 30 MIN

## 2025-05-16 NOTE — TELEPHONE ENCOUNTER
Please offer patient appt 22 April 20 at 130pm HV with Vaughn Burton Selena is a 37 year old year old female here for an OB check.  She is      .  Gestational Age: 36w0d.  Concerns today include: na    She reports fetal movement.  She denies bleeding.  She denies cramping.  She denies nausea.  She reports breast tenderness.      Allergy or Sensitivity to PCN? no  Patient was advised of today's exam.  GBS swab was set out for doctor.   If your visit includes an exam today, would you like an assistant to be present in the room during that time?no    Patient would like communication of their results via: [employer]  Patient's current [facility] status: Active.  Patient's preferred Pharmacy:verified

## 2025-05-20 ENCOUNTER — OFFICE VISIT (OUTPATIENT)
Age: 80
End: 2025-05-20
Payer: MEDICARE

## 2025-05-20 VITALS — HEIGHT: 67 IN | WEIGHT: 188.6 LBS | BODY MASS INDEX: 29.6 KG/M2

## 2025-05-20 DIAGNOSIS — M17.11 PRIMARY OSTEOARTHRITIS OF RIGHT KNEE: Primary | ICD-10-CM

## 2025-05-20 PROCEDURE — 73564 X-RAY EXAM KNEE 4 OR MORE: CPT | Performed by: ORTHOPAEDIC SURGERY

## 2025-05-20 PROCEDURE — 1123F ACP DISCUSS/DSCN MKR DOCD: CPT | Performed by: ORTHOPAEDIC SURGERY

## 2025-05-20 PROCEDURE — 1125F AMNT PAIN NOTED PAIN PRSNT: CPT | Performed by: ORTHOPAEDIC SURGERY

## 2025-05-20 PROCEDURE — 99214 OFFICE O/P EST MOD 30 MIN: CPT | Performed by: ORTHOPAEDIC SURGERY

## 2025-05-20 NOTE — PROGRESS NOTES
Patient: Boston Boyd                MRN: [id number]       SSN: [id number]  YOB: 1945        AGE: 79 y.o.        SEX: male  BMI: Body mass index is 29.54 kg/m².    PCP: Laura Mares DO  05/20/25    Chief Complaint: New Patient (Right knee pain)      1. Primary osteoarthritis of right knee  -     AMB POC XRAY, KNEE; COMPLETE, 4+ VIEW  -     SCHEDULE SURGERY      HPI:  Boston Boyd is a 79 y.o. male with chief complaint of   Chief Complaint   Patient presents with    New Patient     Right knee pain     -2/16/2021: Left TKA, Pinch    Patient had an exacerbation of his right knee pain around early May 2025 after mowing his lawn and doing yard work.  He has had these exacerbations on and off.  They tend to go away after about a week or so.  He has had cortisone injections in the past that do not give him much relief and he states that he has tried Tylenol and ibuprofen as well as other prescription anti-inflammatories without significant relief either.  He can still perform his necessary activities but he has a nagging pain on both the front and medial side of his knee.    Known risk factors for perioperative complications:   Diabetes mellitus   Lab Results   Component Value Date    LABA1C 12.7 (H) 09/30/2020   A1c 5.8 on 6/13/2024    Hypertension           No data to display              Allergies   Allergen Reactions    Omeprazole Angioedema, Hives and Swelling         IMAGING:  Imaging read by myself and interpreted as follows:    May 20, 2025:  4 view x-ray of the right knee including AP, lateral, sunrise and notch view demonstrate bone-on-bone articulation with mild to moderate medial tibial erosion in the medial compartment of the right knee.  There are osteophytes, subchondral sclerosis and subchondral cysts.  There is also subchondral cyst, subchondral sclerosis and osteophytes in the patellofemoral joint.  Of note, a left cemented total knee replacement can be seen in

## 2025-05-20 NOTE — PATIENT INSTRUCTIONS
after surgery and then will start to slowly go down.  ICE, ELEVATE and ANKLE PUMPS to control swelling  Ice 20 minutes every hour  Keep operative extremity elevated above the heart to let gravity drain towards heart  Elevate head if any shortness of breath  Ankle pumps (flex, extend, rotate foot at the ankle) and toe wriggling as much as possible  Exercise  Gently. Freqently.  Do this on your own for the first 2 weeks after surgery  Get up and walk 5-20 steps about every hour  Perform leg extension and flexion about every hour  Extension - leg stretched on bed and use quads to push back of knee into bed, hold 5 seconds and repeat 5 times  Flexion - heel slides on bed, sliding heel towards buttocks (use towel around ankle to help with this), hold 5 seconds and repeat 5 times  Wait until post-op day 3 to start the knee flexion exercises after a Total Knee Arthroplasty  Goal is 90 degrees by 2 weeks  Straight leg raises about every hour, hold 5 seconds if able then rest 5 seconds and repeat 5 times  Deep breathing exercises/Incentive Spirometry  Inhale as deeply as able, hold for 3 count and exhale slowly, repeat 5 times about every hour  Take ACE wrap and cast padding off morning after surgery and put on thigh high compression stocking  Leave surgical dressing in place until follow-up appointment in clinic at 2 weeks  May shower once ACE wrap is removed  Take care that dressing remains in place and no fluid leaks into it.   Contact the office if:  Fever above 100 degrees  Drainage on surgical dressing more than 50% of the absorptive pad  Uncontrolled/intolerable pain     First post-op appointment 2 weeks after surgery  Xrays will be taken  Dressing and any sutures or staples will be removed and wound checked  The wound may be supported with steri-strips (tape-like pieces across incision)  These will fall off on their own as your skin cells turn over  You may shower with soap and water and allow it to run over incision

## 2025-07-16 ENCOUNTER — HOSPITAL ENCOUNTER (OUTPATIENT)
Facility: HOSPITAL | Age: 80
Discharge: HOME | End: 2025-07-19
Payer: MEDICARE

## 2025-07-16 ENCOUNTER — HOSPITAL ENCOUNTER (OUTPATIENT)
Facility: HOSPITAL | Age: 80
Setting detail: SPECIMEN
Discharge: HOME | End: 2025-07-19

## 2025-07-16 DIAGNOSIS — Z01.818 PREOPERATIVE TESTING: ICD-10-CM

## 2025-07-16 DIAGNOSIS — M17.11 PRIMARY OSTEOARTHRITIS OF RIGHT KNEE: ICD-10-CM

## 2025-07-16 DIAGNOSIS — Z01.810 PREOP CARDIOVASCULAR EXAM: ICD-10-CM

## 2025-07-16 DIAGNOSIS — M17.11 PRIMARY OSTEOARTHRITIS OF RIGHT KNEE: Primary | ICD-10-CM

## 2025-07-16 DIAGNOSIS — Z01.811 PRE-OP CHEST EXAM: ICD-10-CM

## 2025-07-16 LAB
EKG ATRIAL RATE: 75 BPM
EKG DIAGNOSIS: NORMAL
EKG P AXIS: 30 DEGREES
EKG P-R INTERVAL: 152 MS
EKG Q-T INTERVAL: 360 MS
EKG QRS DURATION: 78 MS
EKG QTC CALCULATION (BAZETT): 402 MS
EKG R AXIS: 20 DEGREES
EKG T AXIS: 64 DEGREES
EKG VENTRICULAR RATE: 75 BPM
SENTARA SPECIMEN COLLECTION: NORMAL

## 2025-07-16 PROCEDURE — 71046 X-RAY EXAM CHEST 2 VIEWS: CPT

## 2025-07-16 PROCEDURE — 99001 SPECIMEN HANDLING PT-LAB: CPT

## 2025-07-16 PROCEDURE — 93005 ELECTROCARDIOGRAM TRACING: CPT

## 2025-07-16 PROCEDURE — 93010 ELECTROCARDIOGRAM REPORT: CPT | Performed by: INTERNAL MEDICINE

## 2025-07-16 NOTE — PROGRESS NOTES
1. Primary osteoarthritis of right knee  -     EKG 12 Lead; Future  -     XR CHEST (2 VW); Future  -     Urinalysis; Future  -     Protime-INR; Future  -     Hemoglobin A1C; Future  -     Comprehensive Metabolic Panel; Future  -     CBC with Auto Differential; Future  -     APTT; Future  -     Urine Drug Screen; Future  -     Nicotine Metabolites, Urine; Future  2. Preoperative testing  -     EKG 12 Lead; Future  -     XR CHEST (2 VW); Future  -     Urinalysis; Future  -     Protime-INR; Future  -     Hemoglobin A1C; Future  -     Comprehensive Metabolic Panel; Future  -     CBC with Auto Differential; Future  -     APTT; Future  -     Urine Drug Screen; Future  -     Nicotine Metabolites, Urine; Future  3. Preop cardiovascular exam  -     EKG 12 Lead; Future  4. Pre-op chest exam  -     XR CHEST (2 VW); Future

## 2025-07-17 LAB
A/G RATIO: 1.6 RATIO (ref 1.1–2.6)
ALBUMIN: 4.3 G/DL (ref 3.5–5)
ALP BLD-CCNC: 121 U/L (ref 40–125)
ALT SERPL-CCNC: 13 U/L (ref 5–40)
ANION GAP SERPL CALCULATED.3IONS-SCNC: 15 MMOL/L (ref 3–15)
APTT: 26 SEC (ref 22–36)
AST SERPL-CCNC: 15 U/L (ref 10–37)
BASOPHILS # BLD: 1 % (ref 0–2)
BASOPHILS ABSOLUTE: 0 K/UL (ref 0–0.2)
BILIRUB SERPL-MCNC: 0.3 MG/DL (ref 0.2–1.2)
BILIRUBIN, URINE: NEGATIVE
BUN BLDV-MCNC: 33 MG/DL (ref 6–22)
CALCIUM SERPL-MCNC: 8.8 MG/DL (ref 8.4–10.5)
CANNABINOIDS: NORMAL
CHLORIDE BLD-SCNC: 104 MMOL/L (ref 98–110)
CLARITY, UA: CLEAR
CO2: 21 MMOL/L (ref 20–32)
COCAINE: NORMAL
COLOR, UA: YELLOW
CREAT SERPL-MCNC: 1.7 MG/DL (ref 0.8–1.6)
EOSINOPHIL # BLD: 4 % (ref 0–6)
EOSINOPHILS ABSOLUTE: 0.2 K/UL (ref 0–0.5)
ESTIMATED AVERAGE GLUCOSE: 136 MG/DL (ref 91–123)
GFR, ESTIMATED: 40 ML/MIN/1.73 SQ.M.
GLOBULIN: 2.7 G/DL (ref 2–4)
GLUCOSE URINE: NEGATIVE MG/DL
GLUCOSE: 122 MG/DL (ref 70–99)
HBA1C MFR BLD: 6.4 % (ref 4.8–5.6)
HCT VFR BLD CALC: 40.7 % (ref 37.8–52.2)
HEMOGLOBIN: 13.2 G/DL (ref 12.6–17.1)
INR BLD: 0.97 (ref 0.93–1.29)
KETONES, URINE: NEGATIVE MG/DL
LEUKOCYTE ESTERASE, URINE: NEGATIVE
LYMPHOCYTES # BLD: 20 % (ref 20–45)
LYMPHOCYTES ABSOLUTE: 1.1 K/UL (ref 1–4.8)
MCH RBC QN AUTO: 30 PG (ref 26–34)
MCHC RBC AUTO-ENTMCNC: 32 G/DL (ref 31–36)
MCV RBC AUTO: 93 FL (ref 80–95)
MONOCYTES ABSOLUTE: 0.5 K/UL (ref 0.1–1)
MONOCYTES: 10 % (ref 3–12)
NEUTROPHILS ABSOLUTE: 3.6 K/UL (ref 1.8–7.7)
NEUTROPHILS SEGMENTED: 65 % (ref 40–75)
NITRITE, URINE: NEGATIVE
OCCULT BLOOD,URINE: NEGATIVE
PDW BLD-RTO: 13.1 % (ref 10–15.5)
PH, URINE: 5.5 PH (ref 5–8)
PLATELET # BLD: 255 K/UL (ref 140–440)
PMV BLD AUTO: 9.9 FL (ref 9–13)
POTASSIUM SERPL-SCNC: 4.2 MMOL/L (ref 3.5–5.5)
PROTEIN, URINE: NEGATIVE MG/DL
PROTHROMBIN TIME: 10.4 SEC (ref 9–13)
RBC # BLD: 4.39 M/UL (ref 3.8–5.8)
SODIUM BLD-SCNC: 140 MMOL/L (ref 133–145)
SPECIFIC GRAVITY UA: 1.02 (ref 1–1.03)
SPECIFIC GRAVITY UA: NORMAL (ref 4.5–8)
TOTAL PROTEIN: 7 G/DL (ref 6.2–8.1)
UROBILINOGEN, URINE: 0.2 MG/DL
WBC # BLD: 5.5 K/UL (ref 4–11)

## 2025-07-20 LAB
COTININE, URINE: <2 NG/ML
NICOTINE URINE: <2 NG/ML

## 2025-07-22 PROBLEM — M17.11 OSTEOARTHRITIS OF RIGHT KNEE: Status: ACTIVE | Noted: 2025-07-22

## 2025-07-25 ASSESSMENT — PROMIS GLOBAL HEALTH SCALE
IN GENERAL, HOW WOULD YOU RATE YOUR SATISFACTION WITH YOUR SOCIAL ACTIVITIES AND RELATIONSHIPS [ON A SCALE OF 1 (POOR) TO 5 (EXCELLENT)]?: EXCELLENT
IN THE PAST 7 DAYS, HOW OFTEN HAVE YOU BEEN BOTHERED BY EMOTIONAL PROBLEMS, SUCH AS FEELING ANXIOUS, DEPRESSED, OR IRRITABLE [ON A SCALE FROM 1 (NEVER) TO 5 (ALWAYS)]?: NEVER
IN GENERAL, WOULD YOU SAY YOUR QUALITY OF LIFE IS...[ON A SCALE OF 1 (POOR) TO 5 (EXCELLENT)]: EXCELLENT
SUM OF RESPONSES TO QUESTIONS 2, 4, 5, & 10: 20
IN GENERAL, HOW WOULD YOU RATE YOUR PHYSICAL HEALTH [ON A SCALE OF 1 (POOR) TO 5 (EXCELLENT)]?: VERY GOOD
IN GENERAL, PLEASE RATE HOW WELL YOU CARRY OUT YOUR USUAL SOCIAL ACTIVITIES (INCLUDES ACTIVITIES AT HOME, AT WORK, AND IN YOUR COMMUNITY, AND RESPONSIBILITIES AS A PARENT, CHILD, SPOUSE, EMPLOYEE, FRIEND, ETC) [ON A SCALE OF 1 (POOR) TO 5 (EXCELLENT)]?: EXCELLENT
IN THE PAST 7 DAYS, HOW WOULD YOU RATE YOUR PAIN ON AVERAGE [ON A SCALE FROM 0 (NO PAIN) TO 10 (WORST IMAGINABLE PAIN)]?: 10 WORST IMAGINABLE PAIN
IN GENERAL, WOULD YOU SAY YOUR HEALTH IS...[ON A SCALE OF 1 (POOR) TO 5 (EXCELLENT)]: EXCELLENT
WHO IS THE PERSON COMPLETING THE PROMIS V1.1 SURVEY?: SELF
SUM OF RESPONSES TO QUESTIONS 3, 6, 7, & 8: 23
TO WHAT EXTENT ARE YOU ABLE TO CARRY OUT YOUR EVERYDAY PHYSICAL ACTIVITIES SUCH AS WALKING, CLIMBING STAIRS, CARRYING GROCERIES, OR MOVING A CHAIR [ON A SCALE OF 1 (NOT AT ALL) TO 5 (COMPLETELY)]?: MOSTLY
IN GENERAL, HOW WOULD YOU RATE YOUR MENTAL HEALTH, INCLUDING YOUR MOOD AND YOUR ABILITY TO THINK [ON A SCALE OF 1 (POOR) TO 5 (EXCELLENT)]?: EXCELLENT
HOW IS THE PROMIS V1.1 BEING ADMINISTERED?: TELEPHONE

## 2025-07-25 ASSESSMENT — KOOS JR
HOW SEVERE IS YOUR KNEE STIFFNESS AFTER FIRST WAKING IN MORNING: MODERATE
TWISING OR PIVOTING ON KNEE: MODERATE
GOING UP OR DOWN STAIRS: MILD
STANDING UPRIGHT: MILD
KOOS JR TOTAL INTERVAL SCORE: 70.704

## 2025-07-25 NOTE — PERIOP NOTE
Instructions for your surgery at [facility]  [address] [place] [address]      Today's Date:  7/25/2025      Patient's Name:  Boston Boyd           Surgery Date:  7/29              Please enter the main entrance of the hospital and check-in at the front security desk located in the lobby. They will direct you to the area to report for your surgery.     Do NOT eat or drink anything, including candy, gum, or ice chips after midnight prior to your surgery, unless you have specific instructions from your surgeon or anesthesia provider to do so.  Brush your teeth before coming to the hospital. You may swish with water, but do not swallow.  No smoking/Vaping/E-Cigarettes 24 hours prior to the day of surgery.  No alcohol 24 hours prior to the day of surgery.  No recreational drugs for one week prior to the day of surgery.  Bring Photo ID, Insurance information, and Co-pay if required on day of surgery.  Bring in pertinent legal documents, such as, Medical Power of , DNR, Advance Directive, etc.  Leave all valuables, including money/purse, weapons at home.  Remove all jewelry, including ALL body piercings, nail polish, acrylic nails, and makeup (including mascara); no lotions, powders, deodorant, or perfume/cologne/after shave on the skin.  Follow instruction for Hibiclens washes and CHG wipes from surgeon's office.   Glasses and dentures may be worn to the hospital. They must be removed prior to surgery. Please bring case/container for glasses or dentures.   Contact lenses should not be worn on day of surgery.   Call your doctor's office if symptoms of a cold or illness develop within 24-48 hours prior to your surgery.  Call your doctor's office if you have any questions concerning insurance or co-pays.  15. AN ADULT (relative or friend 18 years or older) MUST DRIVE YOU HOME AFTER YOUR SURGERY.  16. Please make arrangements for a responsible adult (18 years or older)

## 2025-07-28 ENCOUNTER — OFFICE VISIT (OUTPATIENT)
Age: 80
End: 2025-07-28

## 2025-07-28 VITALS
BODY MASS INDEX: 29.16 KG/M2 | HEIGHT: 67 IN | WEIGHT: 185.8 LBS | DIASTOLIC BLOOD PRESSURE: 84 MMHG | SYSTOLIC BLOOD PRESSURE: 134 MMHG

## 2025-07-28 DIAGNOSIS — M17.11 PRIMARY OSTEOARTHRITIS OF RIGHT KNEE: ICD-10-CM

## 2025-07-28 DIAGNOSIS — Z01.818 PRE-OP EXAMINATION: Primary | ICD-10-CM

## 2025-07-28 NOTE — PATIENT INSTRUCTIONS
follow-up appointment 10-14 days after surgery.    Any questions:   Call the [facility]  at [phone] and request to speak to the Orthopedic Surgery on-call physician.   OR  Call my , Izzy, at [phone]    SHOWERING  ?   It is OK to shower ONLY if incision remains dry.  ?   You may shower with the outer dressing in place, immediately following surgery. Make sure the edges are sealed to avoid getting the wound wet. If becomes wet under bandage, remove and call the [facility].    After bandage is removed (at 2 week postoperative visit), you may shower and allow soapy water to flow over the incision, but DO NOT scrub the incision.  Pat it dry with a towel.   ?   Do not wash or scrub the incision for 6 weeks.  ?   Do not apply topical antibiotics, creams, lotions, ointments, etc. to the incision for 6 weeks post-op.    Do not submerge in water (pool/tub/bath/ocean/lake/etc.) until 4-6 weeks after surgery or until the incision has healed with no scabs remaining.    PROCEDURES    Any elective dental cleaning/procedure or invasive procedures like a colonoscopy should not be performed within 3 months following a joint replacement.  ?   You must take antibiotic prophylaxis before any dental cleaning or other invasive procedure.    DRIVING  ?   Necessary travel only for 6 weeks.  ?   You should not drive until you are able to walk WITHOUT a walker (a cane is OK) and are not taking any narcotic pain medications. This is usually around 3-4 weeks.  ?   Most patients do not require a handicapped parking pass. If necessary, we will provide one for a maximum duration of 3 months.    QUESTIONS/CONCERNS    Call the [facility]  at [phone] and request to speak to the Orthopedic Surgery on-call physician.     Medication Requests: Call the [facility] main number at [phone], or the [facility]  at [phone] and request to speak to the Orthopedic Surgery on-call phsyician.

## 2025-07-28 NOTE — ASSESSMENT & PLAN NOTE
Planned Surgery Right total knee arthroplasty, kristens   Surgery date Originally planned for 7/29/25 at [facility] but insurance denied the surgery.   Clearance obtained Yes, PCP       PMH Hypertension   Allergies  Allergies   Allergen Reactions    Omeprazole Angioedema, Hives and Swelling       Labs Lab Results   Component Value Date    WBC 5.5 07/16/2025    HGB 13.2 07/16/2025    HCT 40.7 07/16/2025    MCV 93 07/16/2025     07/16/2025    LYMPHOPCT 26 02/03/2021    RBC 4.39 07/16/2025    MCH 30 07/16/2025    MCHC 32 07/16/2025    RDW 13.1 07/16/2025     Hemoglobin A1C   Date Value Ref Range Status   07/16/2025 6.4 (H) 4.8 - 5.6 % Final   ,  Lab Results   Component Value Date     07/16/2025    K 4.2 07/16/2025     07/16/2025    CO2 21 07/16/2025    BUN 33 (H) 07/16/2025    CREATININE 1.7 (H) 07/16/2025    GLUCOSE 122 (H) 07/16/2025    CALCIUM 8.8 07/16/2025    BILITOT 0.3 07/16/2025    ALKPHOS 121 07/16/2025    AST 15 07/16/2025    ALT 13 07/16/2025    LABGLOM 40.0 (L) 07/16/2025    GFRAA >60 02/03/2021    AGRATIO 1.6 07/16/2025    GLOB 2.7 07/16/2025    Advised patient to refrain from NSAIDs and other nephrotic medications. Encouraged hydration leading up to and after surgery. No history of CKD.    No results found for: \"VITD25\"   Lab Results   Component Value Date    INR 0.97 07/16/2025    INR 1.0 02/03/2021    INR 0.9 09/30/2020    PROTIME 10.4 07/16/2025    PROTIME 12.6 02/03/2021    PROTIME 12.3 09/30/2020     Lab Results   Component Value Date    APTT 26 07/16/2025      Pharm [facility] [address]   Sent? no   PT [facility] in motion  Sent? no   Anesthesia  Anesthesia was also discussed with them today including spinal anesthesia vs general anesthesia. The risks and benefits were explained for both including the risks of nausea, vomiting, confusion, sore throat, back pain (with spinal anesthesia) and allergic reaction. The risk of cardiac and pulmonary complication with general anesthesia were also

## 2025-07-28 NOTE — H&P (VIEW-ONLY)
Patient: Boston Boyd                MRN: [id number]       SSN: [id number]  YOB: 1945        AGE: 80 y.o.        SEX: male  BMI: Body mass index is 29.1 kg/m².    PCP: Neeraj Koch MD  07/28/25    Chief Complaint: Pre-op Exam (Right total knee )      1. Pre-op examination  -     AMB POC XRAY, KNEE; 1/2 VIEWS  2. Primary osteoarthritis of right knee  Assessment & Plan:  Planned Surgery Right total knee arthroplasty, enovis   Surgery date Originally planned for 7/29/25 at [facility] but insurance denied the surgery.   Clearance obtained Yes, PCP       PMH Hypertension   Allergies  Allergies   Allergen Reactions    Omeprazole Angioedema, Hives and Swelling       Labs Lab Results   Component Value Date    WBC 5.5 07/16/2025    HGB 13.2 07/16/2025    HCT 40.7 07/16/2025    MCV 93 07/16/2025     07/16/2025    LYMPHOPCT 26 02/03/2021    RBC 4.39 07/16/2025    MCH 30 07/16/2025    MCHC 32 07/16/2025    RDW 13.1 07/16/2025     Hemoglobin A1C   Date Value Ref Range Status   07/16/2025 6.4 (H) 4.8 - 5.6 % Final   ,  Lab Results   Component Value Date     07/16/2025    K 4.2 07/16/2025     07/16/2025    CO2 21 07/16/2025    BUN 33 (H) 07/16/2025    CREATININE 1.7 (H) 07/16/2025    GLUCOSE 122 (H) 07/16/2025    CALCIUM 8.8 07/16/2025    BILITOT 0.3 07/16/2025    ALKPHOS 121 07/16/2025    AST 15 07/16/2025    ALT 13 07/16/2025    LABGLOM 40.0 (L) 07/16/2025    GFRAA >60 02/03/2021    AGRATIO 1.6 07/16/2025    GLOB 2.7 07/16/2025    Advised patient to refrain from NSAIDs and other nephrotic medications. Encouraged hydration leading up to and after surgery. No history of CKD.    No results found for: \"VITD25\"   Lab Results   Component Value Date    INR 0.97 07/16/2025    INR 1.0 02/03/2021    INR 0.9 09/30/2020    PROTIME 10.4 07/16/2025    PROTIME 12.6 02/03/2021    PROTIME 12.3 09/30/2020     Lab Results   Component Value Date    APTT 26 07/16/2025      [facility]

## 2025-07-28 NOTE — PROGRESS NOTES
Patient: Boston Boyd                MRN: [id number]       SSN: [id number]  YOB: 1945        AGE: 80 y.o.        SEX: male  BMI: Body mass index is 29.1 kg/m².    PCP: Neeraj Koch MD  07/28/25    Chief Complaint: Pre-op Exam (Right total knee )      1. Pre-op examination  -     AMB POC XRAY, KNEE; 1/2 VIEWS  2. Primary osteoarthritis of right knee  Assessment & Plan:  Planned Surgery Right total knee arthroplasty, enovis   Surgery date Originally planned for 7/29/25 at [facility] but insurance denied the surgery.   Clearance obtained Yes, PCP       PMH Hypertension   Allergies  Allergies   Allergen Reactions    Omeprazole Angioedema, Hives and Swelling       Labs Lab Results   Component Value Date    WBC 5.5 07/16/2025    HGB 13.2 07/16/2025    HCT 40.7 07/16/2025    MCV 93 07/16/2025     07/16/2025    LYMPHOPCT 26 02/03/2021    RBC 4.39 07/16/2025    MCH 30 07/16/2025    MCHC 32 07/16/2025    RDW 13.1 07/16/2025     Hemoglobin A1C   Date Value Ref Range Status   07/16/2025 6.4 (H) 4.8 - 5.6 % Final   ,  Lab Results   Component Value Date     07/16/2025    K 4.2 07/16/2025     07/16/2025    CO2 21 07/16/2025    BUN 33 (H) 07/16/2025    CREATININE 1.7 (H) 07/16/2025    GLUCOSE 122 (H) 07/16/2025    CALCIUM 8.8 07/16/2025    BILITOT 0.3 07/16/2025    ALKPHOS 121 07/16/2025    AST 15 07/16/2025    ALT 13 07/16/2025    LABGLOM 40.0 (L) 07/16/2025    GFRAA >60 02/03/2021    AGRATIO 1.6 07/16/2025    GLOB 2.7 07/16/2025    Advised patient to refrain from NSAIDs and other nephrotic medications. Encouraged hydration leading up to and after surgery. No history of CKD.    No results found for: \"VITD25\"   Lab Results   Component Value Date    INR 0.97 07/16/2025    INR 1.0 02/03/2021    INR 0.9 09/30/2020    PROTIME 10.4 07/16/2025    PROTIME 12.6 02/03/2021    PROTIME 12.3 09/30/2020     Lab Results   Component Value Date    APTT 26 07/16/2025      [facility]

## 2025-07-29 ENCOUNTER — HOSPITAL ENCOUNTER (OUTPATIENT)
Age: 80
Discharge: HOME | End: 2025-07-29
Attending: ORTHOPAEDIC SURGERY | Admitting: ORTHOPAEDIC SURGERY
Payer: MEDICARE

## 2025-07-29 ENCOUNTER — APPOINTMENT (OUTPATIENT)
Age: 80
End: 2025-07-29
Attending: ORTHOPAEDIC SURGERY
Payer: MEDICARE

## 2025-07-29 ENCOUNTER — ANESTHESIA (OUTPATIENT)
Age: 80
End: 2025-07-29
Payer: MEDICARE

## 2025-07-29 ENCOUNTER — ANESTHESIA EVENT (OUTPATIENT)
Age: 80
End: 2025-07-29
Payer: MEDICARE

## 2025-07-29 VITALS
WEIGHT: 183.8 LBS | RESPIRATION RATE: 18 BRPM | DIASTOLIC BLOOD PRESSURE: 82 MMHG | HEIGHT: 67 IN | BODY MASS INDEX: 28.85 KG/M2 | HEART RATE: 77 BPM | TEMPERATURE: 97.4 F | SYSTOLIC BLOOD PRESSURE: 137 MMHG | OXYGEN SATURATION: 95 %

## 2025-07-29 DIAGNOSIS — Z96.651 STATUS POST RIGHT KNEE REPLACEMENT: Primary | ICD-10-CM

## 2025-07-29 LAB
GLUCOSE BLD STRIP.AUTO-MCNC: 131 MG/DL (ref 70–110)
GLUCOSE BLD STRIP.AUTO-MCNC: 184 MG/DL (ref 70–110)

## 2025-07-29 PROCEDURE — 3600000012 HC SURGERY LEVEL 2 ADDTL 15MIN: Performed by: ORTHOPAEDIC SURGERY

## 2025-07-29 PROCEDURE — C1713 ANCHOR/SCREW BN/BN,TIS/BN: HCPCS | Performed by: ORTHOPAEDIC SURGERY

## 2025-07-29 PROCEDURE — 6360000002 HC RX W HCPCS: Performed by: ANESTHESIOLOGY

## 2025-07-29 PROCEDURE — 6370000000 HC RX 637 (ALT 250 FOR IP)

## 2025-07-29 PROCEDURE — 7100000010 HC PHASE II RECOVERY - FIRST 15 MIN: Performed by: ORTHOPAEDIC SURGERY

## 2025-07-29 PROCEDURE — 7100000001 HC PACU RECOVERY - ADDTL 15 MIN: Performed by: ORTHOPAEDIC SURGERY

## 2025-07-29 PROCEDURE — 6360000002 HC RX W HCPCS: Performed by: ORTHOPAEDIC SURGERY

## 2025-07-29 PROCEDURE — 64447 NJX AA&/STRD FEMORAL NRV IMG: CPT | Performed by: ANESTHESIOLOGY

## 2025-07-29 PROCEDURE — 6370000000 HC RX 637 (ALT 250 FOR IP): Performed by: ANESTHESIOLOGY

## 2025-07-29 PROCEDURE — 7100000000 HC PACU RECOVERY - FIRST 15 MIN: Performed by: ORTHOPAEDIC SURGERY

## 2025-07-29 PROCEDURE — C1776 JOINT DEVICE (IMPLANTABLE): HCPCS | Performed by: ORTHOPAEDIC SURGERY

## 2025-07-29 PROCEDURE — 27447 TOTAL KNEE ARTHROPLASTY: CPT

## 2025-07-29 PROCEDURE — 2580000003 HC RX 258: Performed by: ORTHOPAEDIC SURGERY

## 2025-07-29 PROCEDURE — 27447 TOTAL KNEE ARTHROPLASTY: CPT | Performed by: ORTHOPAEDIC SURGERY

## 2025-07-29 PROCEDURE — 2580000003 HC RX 258: Performed by: ANESTHESIOLOGY

## 2025-07-29 PROCEDURE — 2709999900 HC NON-CHARGEABLE SUPPLY: Performed by: ORTHOPAEDIC SURGERY

## 2025-07-29 PROCEDURE — 7100000011 HC PHASE II RECOVERY - ADDTL 15 MIN: Performed by: ORTHOPAEDIC SURGERY

## 2025-07-29 PROCEDURE — 2500000003 HC RX 250 WO HCPCS: Performed by: ANESTHESIOLOGY

## 2025-07-29 PROCEDURE — 73560 X-RAY EXAM OF KNEE 1 OR 2: CPT

## 2025-07-29 PROCEDURE — 3700000001 HC ADD 15 MINUTES (ANESTHESIA): Performed by: ORTHOPAEDIC SURGERY

## 2025-07-29 PROCEDURE — 82962 GLUCOSE BLOOD TEST: CPT

## 2025-07-29 PROCEDURE — 97116 GAIT TRAINING THERAPY: CPT

## 2025-07-29 PROCEDURE — 6370000000 HC RX 637 (ALT 250 FOR IP): Performed by: ORTHOPAEDIC SURGERY

## 2025-07-29 PROCEDURE — 3700000000 HC ANESTHESIA ATTENDED CARE: Performed by: ORTHOPAEDIC SURGERY

## 2025-07-29 PROCEDURE — 97161 PT EVAL LOW COMPLEX 20 MIN: CPT

## 2025-07-29 PROCEDURE — 3600000002 HC SURGERY LEVEL 2 BASE: Performed by: ORTHOPAEDIC SURGERY

## 2025-07-29 DEVICE — DJO EMPOWR KNEETM, UNIVERSAL BP, NP 9R
Type: IMPLANTABLE DEVICE | Site: KNEE | Status: FUNCTIONAL
Brand: DJO SURGICAL

## 2025-07-29 DEVICE — DOMED TRI-PEG PATELLA, 35X9MM, E-PLUS
Type: IMPLANTABLE DEVICE | Site: KNEE | Status: FUNCTIONAL
Brand: DJO SURGICAL

## 2025-07-29 DEVICE — EMPOWR 3D KNEETM INS, 9R 10MM, VE
Type: IMPLANTABLE DEVICE | Site: KNEE | Status: FUNCTIONAL
Brand: DJO SURGICAL

## 2025-07-29 DEVICE — EMPOWR 3D KNEETM FEMUR, NP, 9R
Type: IMPLANTABLE DEVICE | Site: KNEE | Status: FUNCTIONAL
Brand: DJO SURGICAL

## 2025-07-29 RX ORDER — SODIUM CHLORIDE, SODIUM LACTATE, POTASSIUM CHLORIDE, CALCIUM CHLORIDE 600; 310; 30; 20 MG/100ML; MG/100ML; MG/100ML; MG/100ML
INJECTION, SOLUTION INTRAVENOUS
Status: DISCONTINUED | OUTPATIENT
Start: 2025-07-29 | End: 2025-07-29 | Stop reason: SDUPTHER

## 2025-07-29 RX ORDER — SODIUM CHLORIDE 9 MG/ML
INJECTION, SOLUTION INTRAVENOUS PRN
Status: DISCONTINUED | OUTPATIENT
Start: 2025-07-29 | End: 2025-07-29 | Stop reason: HOSPADM

## 2025-07-29 RX ORDER — SODIUM CHLORIDE 0.9 % (FLUSH) 0.9 %
5-40 SYRINGE (ML) INJECTION PRN
Status: DISCONTINUED | OUTPATIENT
Start: 2025-07-29 | End: 2025-07-29 | Stop reason: HOSPADM

## 2025-07-29 RX ORDER — SODIUM CHLORIDE 0.9 % (FLUSH) 0.9 %
5-40 SYRINGE (ML) INJECTION EVERY 12 HOURS SCHEDULED
Status: CANCELLED | OUTPATIENT
Start: 2025-07-29

## 2025-07-29 RX ORDER — ONDANSETRON 4 MG/1
4 TABLET, ORALLY DISINTEGRATING ORAL EVERY 8 HOURS PRN
Status: CANCELLED | OUTPATIENT
Start: 2025-07-29

## 2025-07-29 RX ORDER — IPRATROPIUM BROMIDE AND ALBUTEROL SULFATE 2.5; .5 MG/3ML; MG/3ML
1 SOLUTION RESPIRATORY (INHALATION)
Status: DISCONTINUED | OUTPATIENT
Start: 2025-07-29 | End: 2025-07-29 | Stop reason: HOSPADM

## 2025-07-29 RX ORDER — OXYCODONE HYDROCHLORIDE 5 MG/1
5 TABLET ORAL EVERY 6 HOURS PRN
Qty: 10 TABLET | Refills: 0 | Status: SHIPPED | OUTPATIENT
Start: 2025-07-29 | End: 2025-08-05

## 2025-07-29 RX ORDER — ONDANSETRON 2 MG/ML
4 INJECTION INTRAMUSCULAR; INTRAVENOUS
Status: DISCONTINUED | OUTPATIENT
Start: 2025-07-29 | End: 2025-07-29 | Stop reason: HOSPADM

## 2025-07-29 RX ORDER — DOCUSATE SODIUM 100 MG/1
100 CAPSULE, LIQUID FILLED ORAL 2 TIMES DAILY
Qty: 60 CAPSULE | Refills: 0 | Status: SHIPPED | OUTPATIENT
Start: 2025-07-29 | End: 2025-08-28

## 2025-07-29 RX ORDER — SODIUM CHLORIDE 0.9 % (FLUSH) 0.9 %
5-40 SYRINGE (ML) INJECTION PRN
Status: CANCELLED | OUTPATIENT
Start: 2025-07-29

## 2025-07-29 RX ORDER — PROPOFOL 10 MG/ML
INJECTION, EMULSION INTRAVENOUS
Status: DISCONTINUED | OUTPATIENT
Start: 2025-07-29 | End: 2025-07-29 | Stop reason: SDUPTHER

## 2025-07-29 RX ORDER — DIPHENHYDRAMINE HCL 25 MG
25 CAPSULE ORAL EVERY 6 HOURS PRN
Status: CANCELLED | OUTPATIENT
Start: 2025-07-29

## 2025-07-29 RX ORDER — ACETAMINOPHEN 500 MG
1000 TABLET ORAL EVERY 8 HOURS SCHEDULED
Status: CANCELLED | OUTPATIENT
Start: 2025-07-29 | End: 2025-08-28

## 2025-07-29 RX ORDER — SODIUM CHLORIDE 0.9 % (FLUSH) 0.9 %
5-40 SYRINGE (ML) INJECTION EVERY 12 HOURS SCHEDULED
Status: DISCONTINUED | OUTPATIENT
Start: 2025-07-29 | End: 2025-07-29 | Stop reason: HOSPADM

## 2025-07-29 RX ORDER — ACETAMINOPHEN 160 MG
TABLET,DISINTEGRATING ORAL PRN
Status: DISCONTINUED | OUTPATIENT
Start: 2025-07-29 | End: 2025-07-29 | Stop reason: ALTCHOICE

## 2025-07-29 RX ORDER — MELOXICAM 15 MG/1
15 TABLET ORAL DAILY
Qty: 30 TABLET | Refills: 1 | Status: SHIPPED | OUTPATIENT
Start: 2025-07-29 | End: 2025-09-27

## 2025-07-29 RX ORDER — VANCOMYCIN HYDROCHLORIDE 1 G/20ML
INJECTION, POWDER, LYOPHILIZED, FOR SOLUTION INTRAVENOUS PRN
Status: DISCONTINUED | OUTPATIENT
Start: 2025-07-29 | End: 2025-07-29 | Stop reason: ALTCHOICE

## 2025-07-29 RX ORDER — OXYCODONE HYDROCHLORIDE 5 MG/1
2.5 TABLET ORAL PRN
Status: COMPLETED | OUTPATIENT
Start: 2025-07-29 | End: 2025-07-29

## 2025-07-29 RX ORDER — SODIUM CHLORIDE, SODIUM LACTATE, POTASSIUM CHLORIDE, CALCIUM CHLORIDE 600; 310; 30; 20 MG/100ML; MG/100ML; MG/100ML; MG/100ML
INJECTION, SOLUTION INTRAVENOUS CONTINUOUS
Status: DISCONTINUED | OUTPATIENT
Start: 2025-07-29 | End: 2025-07-29 | Stop reason: HOSPADM

## 2025-07-29 RX ORDER — SODIUM CHLORIDE 9 MG/ML
INJECTION, SOLUTION INTRAVENOUS PRN
Status: CANCELLED | OUTPATIENT
Start: 2025-07-29

## 2025-07-29 RX ORDER — POLYETHYLENE GLYCOL 3350 17 G/17G
17 POWDER, FOR SOLUTION ORAL DAILY PRN
Status: CANCELLED | OUTPATIENT
Start: 2025-07-29

## 2025-07-29 RX ORDER — LABETALOL HYDROCHLORIDE 5 MG/ML
5 INJECTION, SOLUTION INTRAVENOUS EVERY 10 MIN PRN
Status: DISCONTINUED | OUTPATIENT
Start: 2025-07-29 | End: 2025-07-29 | Stop reason: HOSPADM

## 2025-07-29 RX ORDER — ACETAMINOPHEN 500 MG
1000 TABLET ORAL EVERY 8 HOURS
Qty: 180 TABLET | Refills: 0 | Status: SHIPPED | OUTPATIENT
Start: 2025-07-29 | End: 2025-08-28

## 2025-07-29 RX ORDER — ACETAMINOPHEN 500 MG
1000 TABLET ORAL ONCE
Status: COMPLETED | OUTPATIENT
Start: 2025-07-29 | End: 2025-07-29

## 2025-07-29 RX ORDER — FENTANYL CITRATE 50 UG/ML
25 INJECTION, SOLUTION INTRAMUSCULAR; INTRAVENOUS EVERY 5 MIN PRN
Status: DISCONTINUED | OUTPATIENT
Start: 2025-07-29 | End: 2025-07-29 | Stop reason: HOSPADM

## 2025-07-29 RX ORDER — DIPHENHYDRAMINE HYDROCHLORIDE 50 MG/ML
25 INJECTION, SOLUTION INTRAMUSCULAR; INTRAVENOUS EVERY 6 HOURS PRN
Status: CANCELLED | OUTPATIENT
Start: 2025-07-29

## 2025-07-29 RX ORDER — DEXAMETHASONE SODIUM PHOSPHATE 10 MG/ML
10 INJECTION, SOLUTION INTRAMUSCULAR; INTRAVENOUS ONCE
Status: DISCONTINUED | OUTPATIENT
Start: 2025-07-29 | End: 2025-07-29 | Stop reason: HOSPADM

## 2025-07-29 RX ORDER — ONDANSETRON 2 MG/ML
INJECTION INTRAMUSCULAR; INTRAVENOUS
Status: DISCONTINUED | OUTPATIENT
Start: 2025-07-29 | End: 2025-07-29 | Stop reason: SDUPTHER

## 2025-07-29 RX ORDER — KETOROLAC TROMETHAMINE 15 MG/ML
15 INJECTION, SOLUTION INTRAMUSCULAR; INTRAVENOUS EVERY 6 HOURS
Status: CANCELLED | OUTPATIENT
Start: 2025-07-29 | End: 2025-07-30

## 2025-07-29 RX ORDER — TAMSULOSIN HYDROCHLORIDE 0.4 MG/1
0.4 CAPSULE ORAL DAILY
Status: DISCONTINUED | OUTPATIENT
Start: 2025-07-29 | End: 2025-07-29 | Stop reason: HOSPADM

## 2025-07-29 RX ORDER — MELOXICAM 7.5 MG/1
7.5 TABLET ORAL ONCE
Status: COMPLETED | OUTPATIENT
Start: 2025-07-29 | End: 2025-07-29

## 2025-07-29 RX ORDER — ASPIRIN 81 MG/1
81 TABLET, CHEWABLE ORAL 2 TIMES DAILY
Qty: 60 TABLET | Refills: 0 | Status: SHIPPED | OUTPATIENT
Start: 2025-07-29 | End: 2025-08-28

## 2025-07-29 RX ORDER — DEXAMETHASONE SODIUM PHOSPHATE 4 MG/ML
INJECTION, SOLUTION INTRA-ARTICULAR; INTRALESIONAL; INTRAMUSCULAR; INTRAVENOUS; SOFT TISSUE
Status: DISCONTINUED | OUTPATIENT
Start: 2025-07-29 | End: 2025-07-29 | Stop reason: SDUPTHER

## 2025-07-29 RX ORDER — BISACODYL 5 MG/1
5 TABLET, DELAYED RELEASE ORAL DAILY
Status: CANCELLED | OUTPATIENT
Start: 2025-07-29

## 2025-07-29 RX ORDER — OXYCODONE HYDROCHLORIDE 5 MG/1
5 TABLET ORAL PRN
Status: COMPLETED | OUTPATIENT
Start: 2025-07-29 | End: 2025-07-29

## 2025-07-29 RX ORDER — PHENYLEPHRINE HCL IN 0.9% NACL 1 MG/10 ML
SYRINGE (ML) INTRAVENOUS
Status: DISCONTINUED | OUTPATIENT
Start: 2025-07-29 | End: 2025-07-29 | Stop reason: SDUPTHER

## 2025-07-29 RX ORDER — FAMOTIDINE 20 MG/1
20 TABLET, FILM COATED ORAL 2 TIMES DAILY
Status: CANCELLED | OUTPATIENT
Start: 2025-07-29

## 2025-07-29 RX ORDER — LIDOCAINE HYDROCHLORIDE 20 MG/ML
INJECTION, SOLUTION EPIDURAL; INFILTRATION; INTRACAUDAL; PERINEURAL
Status: DISCONTINUED | OUTPATIENT
Start: 2025-07-29 | End: 2025-07-29 | Stop reason: SDUPTHER

## 2025-07-29 RX ORDER — OXYCODONE HYDROCHLORIDE 5 MG/1
5 TABLET ORAL EVERY 4 HOURS PRN
Refills: 0 | Status: CANCELLED | OUTPATIENT
Start: 2025-07-29

## 2025-07-29 RX ORDER — OXYCODONE HYDROCHLORIDE 10 MG/1
10 TABLET ORAL EVERY 4 HOURS PRN
Refills: 0 | Status: CANCELLED | OUTPATIENT
Start: 2025-07-29

## 2025-07-29 RX ORDER — CEFAZOLIN SODIUM 1 G/3ML
INJECTION, POWDER, FOR SOLUTION INTRAMUSCULAR; INTRAVENOUS
Status: DISCONTINUED | OUTPATIENT
Start: 2025-07-29 | End: 2025-07-29 | Stop reason: SDUPTHER

## 2025-07-29 RX ORDER — SODIUM PHOSPHATE, DIBASIC AND SODIUM PHOSPHATE, MONOBASIC 7; 19 G/230ML; G/230ML
1 ENEMA RECTAL DAILY PRN
Status: CANCELLED | OUTPATIENT
Start: 2025-07-29

## 2025-07-29 RX ORDER — ASPIRIN 81 MG/1
81 TABLET ORAL 2 TIMES DAILY
Status: CANCELLED | OUTPATIENT
Start: 2025-07-30

## 2025-07-29 RX ORDER — TRAMADOL HYDROCHLORIDE 50 MG/1
50 TABLET ORAL EVERY 6 HOURS
Status: CANCELLED | OUTPATIENT
Start: 2025-07-29

## 2025-07-29 RX ORDER — ONDANSETRON 2 MG/ML
4 INJECTION INTRAMUSCULAR; INTRAVENOUS EVERY 6 HOURS PRN
Status: CANCELLED | OUTPATIENT
Start: 2025-07-29

## 2025-07-29 RX ORDER — KETAMINE HCL 50MG/ML(1)
SYRINGE (ML) INTRAVENOUS
Status: DISCONTINUED | OUTPATIENT
Start: 2025-07-29 | End: 2025-07-29 | Stop reason: SDUPTHER

## 2025-07-29 RX ORDER — ONDANSETRON 8 MG/1
8 TABLET, ORALLY DISINTEGRATING ORAL EVERY 8 HOURS PRN
Qty: 10 TABLET | Refills: 0 | Status: SHIPPED | OUTPATIENT
Start: 2025-07-29

## 2025-07-29 RX ORDER — PANTOPRAZOLE SODIUM 40 MG/1
40 TABLET, DELAYED RELEASE ORAL DAILY
Qty: 30 TABLET | Refills: 0 | Status: SHIPPED | OUTPATIENT
Start: 2025-07-29 | End: 2025-08-28

## 2025-07-29 RX ORDER — ROPIVACAINE HYDROCHLORIDE 2 MG/ML
INJECTION, SOLUTION EPIDURAL; INFILTRATION; PERINEURAL
Status: COMPLETED | OUTPATIENT
Start: 2025-07-29 | End: 2025-07-29

## 2025-07-29 RX ORDER — TRAMADOL HYDROCHLORIDE 50 MG/1
50 TABLET ORAL EVERY 6 HOURS PRN
Qty: 28 TABLET | Refills: 0 | Status: SHIPPED | OUTPATIENT
Start: 2025-07-29 | End: 2025-08-05

## 2025-07-29 RX ORDER — FENTANYL CITRATE 50 UG/ML
INJECTION, SOLUTION INTRAMUSCULAR; INTRAVENOUS
Status: DISCONTINUED | OUTPATIENT
Start: 2025-07-29 | End: 2025-07-29 | Stop reason: SDUPTHER

## 2025-07-29 RX ADMIN — Medication 100 MCG: at 12:05

## 2025-07-29 RX ADMIN — Medication 20 MG: at 12:52

## 2025-07-29 RX ADMIN — FENTANYL CITRATE 25 MCG: 50 INJECTION INTRAMUSCULAR; INTRAVENOUS at 12:52

## 2025-07-29 RX ADMIN — LIDOCAINE HYDROCHLORIDE 50 MG: 20 INJECTION, SOLUTION EPIDURAL; INFILTRATION; INTRACAUDAL; PERINEURAL at 10:30

## 2025-07-29 RX ADMIN — SODIUM CHLORIDE, SODIUM LACTATE, POTASSIUM CHLORIDE, AND CALCIUM CHLORIDE: .6; .31; .03; .02 INJECTION, SOLUTION INTRAVENOUS at 09:42

## 2025-07-29 RX ADMIN — Medication 100 MCG: at 12:38

## 2025-07-29 RX ADMIN — FENTANYL CITRATE 50 MCG: 50 INJECTION INTRAMUSCULAR; INTRAVENOUS at 10:30

## 2025-07-29 RX ADMIN — Medication 10 MG: at 10:30

## 2025-07-29 RX ADMIN — Medication 100 MCG: at 12:08

## 2025-07-29 RX ADMIN — MEPIVACAINE HYDROCHLORIDE 50 MG: 15 INJECTION, SOLUTION EPIDURAL; INFILTRATION at 10:33

## 2025-07-29 RX ADMIN — OXYCODONE HYDROCHLORIDE 5 MG: 5 TABLET ORAL at 14:52

## 2025-07-29 RX ADMIN — CEFAZOLIN 2 G: 1 INJECTION, POWDER, FOR SOLUTION INTRAMUSCULAR; INTRAVENOUS at 11:10

## 2025-07-29 RX ADMIN — PROPOFOL 100 MCG/KG/MIN: 10 INJECTION, EMULSION INTRAVENOUS at 11:15

## 2025-07-29 RX ADMIN — Medication 100 MCG: at 12:30

## 2025-07-29 RX ADMIN — PROPOFOL 30 MG: 10 INJECTION, EMULSION INTRAVENOUS at 10:30

## 2025-07-29 RX ADMIN — Medication 100 MCG: at 11:51

## 2025-07-29 RX ADMIN — Medication 100 MCG: at 11:07

## 2025-07-29 RX ADMIN — ROPIVACAINE HYDROCHLORIDE 20 ML: 2 INJECTION EPIDURAL; INFILTRATION; PERINEURAL at 10:37

## 2025-07-29 RX ADMIN — Medication 100 MCG: at 11:49

## 2025-07-29 RX ADMIN — MELOXICAM 7.5 MG: 7.5 TABLET ORAL at 09:56

## 2025-07-29 RX ADMIN — SODIUM CHLORIDE, SODIUM LACTATE, POTASSIUM CHLORIDE, AND CALCIUM CHLORIDE: 600; 310; 30; 20 INJECTION, SOLUTION INTRAVENOUS at 10:30

## 2025-07-29 RX ADMIN — ONDANSETRON HYDROCHLORIDE 4 MG: 2 SOLUTION INTRAMUSCULAR; INTRAVENOUS at 11:19

## 2025-07-29 RX ADMIN — SODIUM CHLORIDE, SODIUM LACTATE, POTASSIUM CHLORIDE, AND CALCIUM CHLORIDE: 600; 310; 30; 20 INJECTION, SOLUTION INTRAVENOUS at 12:22

## 2025-07-29 RX ADMIN — Medication 100 MCG: at 12:19

## 2025-07-29 RX ADMIN — DEXAMETHASONE SODIUM PHOSPHATE 4 MG: 4 INJECTION, SOLUTION INTRAMUSCULAR; INTRAVENOUS at 11:19

## 2025-07-29 RX ADMIN — ACETAMINOPHEN 1000 MG: 500 TABLET ORAL at 09:56

## 2025-07-29 RX ADMIN — Medication 100 MCG: at 11:43

## 2025-07-29 RX ADMIN — LIDOCAINE HYDROCHLORIDE 50 MG: 20 INJECTION, SOLUTION EPIDURAL; INFILTRATION; INTRACAUDAL; PERINEURAL at 11:15

## 2025-07-29 RX ADMIN — TAMSULOSIN HYDROCHLORIDE 0.4 MG: 0.4 CAPSULE ORAL at 09:56

## 2025-07-29 RX ADMIN — Medication 100 MCG: at 11:15

## 2025-07-29 ASSESSMENT — PAIN DESCRIPTION - LOCATION
LOCATION: KNEE
LOCATION: KNEE

## 2025-07-29 ASSESSMENT — PAIN DESCRIPTION - ORIENTATION
ORIENTATION: RIGHT
ORIENTATION: RIGHT

## 2025-07-29 ASSESSMENT — PAIN SCALES - GENERAL
PAINLEVEL_OUTOF10: 2
PAINLEVEL_OUTOF10: 2
PAINLEVEL_OUTOF10: 4

## 2025-07-29 ASSESSMENT — PAIN - FUNCTIONAL ASSESSMENT
PAIN_FUNCTIONAL_ASSESSMENT: 0-10
PAIN_FUNCTIONAL_ASSESSMENT: PREVENTS OR INTERFERES SOME ACTIVE ACTIVITIES AND ADLS

## 2025-07-29 ASSESSMENT — PAIN DESCRIPTION - DESCRIPTORS: DESCRIPTORS: ACHING

## 2025-07-29 NOTE — OP NOTE
rotation. The bony surfaces were cleaned and dried and the final components were cemented in place. Excess cement was removed and cement allowed to harden. A betadine wash and irrigation was completed.    1 g of vancomycin powder was placed intra-articularly.  The arthrotomy was repaired with with the knee in deep flexion using interrupted #5 Ethibond and #1 vicryl and then completed with a running #1 double-ended barbed suture. The skin dermis was approximated with 2-0 monocryl suture followed by a running 3-0 double-ended, barbed suture in the subcuticular layer.  The skin was cleansed and dried and skin glue was applied. Once dry, a Mepilex was applied in deep flexion. At the end of the procedure, all counts were correct times two. An ankle to groin ACE wrap was placed.  The patient was transferred to PACU in stable condition.     There were no immediate complications. Post operatively the patient will be weight bearing as tolerated. The patient will attempt to walk on post-operative day 0 and will have DVT chemoprophylaxis along with early mobilization and mechanical prophylaxis while in house. The patient will remain on our postoperative pain regimen to minimize heavy narcotics.    I was present and scrubbed for all key portions of the procedure.                     Electronically signed by Jacques Parnell DO on 7/29/2025 at 1:08 PM

## 2025-07-29 NOTE — PERIOP NOTE
Pt unable to void.  Spoke to Dr. Hernandez Pt given to option to stay until he can void or he can go home and return to the Emergency room if he is unable to void.  Pt would like to go home.  Pts family member educated on returning to the ER if patient is unable to void.  Understanding verbalized.

## 2025-07-29 NOTE — ANESTHESIA POSTPROCEDURE EVALUATION
Department of Anesthesiology  Postprocedure Note    Patient: Boston Boyd  MRN: [id number]  YOB: 1945  Date of evaluation: 7/29/2025    Procedure Summary       Date: 07/29/25 Room / Location: [facility] MAIN 04 / [facility]IN OR    Anesthesia Start: 1030 Anesthesia Stop: 1337    Procedure: RIGHT TOTAL KNEE ARTHROPLASTY [ENOVIS] ADDUCTOR CANAL BLOCK 2 SA'S (Right: Knee) Diagnosis:       Osteoarthritis of right knee      (Osteoarthritis of right knee [M17.11])    Surgeons: Jacques Parnell DO Responsible Provider: Shalom Hernandez III, MD    Anesthesia Type: spinal, regional ASA Status: 3            Anesthesia Type: No value filed.    Tano Phase I: Tano Score: 10    Tano Phase II:      Anesthesia Post Evaluation    Patient location during evaluation: PACU  Patient participation: complete - patient participated  Level of consciousness: awake and alert  Pain score: 0  Airway patency: patent  Nausea & Vomiting: no nausea  Cardiovascular status: hemodynamically stable  Respiratory status: acceptable  Hydration status: euvolemic  Comments: Spinal regressing as expected, internal rotation and knee flex, RNs to monitor until resolution and notify [facility] with any issues   Multimodal analgesia pain management approach  Pain management: adequate    No notable events documented.

## 2025-07-29 NOTE — ANESTHESIA PRE PROCEDURE
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       Past Medical History:        Diagnosis Date   • Diabetes (HCC)    • GERD (gastroesophageal reflux disease)    • Hypertension    • Impaired fasting glucose February 2010       Past Surgical History:        Procedure Laterality Date   • COLONOSCOPY  2006   • WA UNLISTED PROCEDURE ABDOMEN PERITONEUM & OMENTUM  1968    secondary to injury   • TOTAL KNEE ARTHROPLASTY      left knee 2021       Social History:    Social History     Tobacco Use   • Smoking status: Never   • Smokeless tobacco: Never   Substance Use Topics   • Alcohol use: No                                Counseling given: Not Answered      Vital Signs (Current):   Vitals:    07/25/25 1101   Weight: 127.5 kg (281 lb)   Height: 1.702 m (5' 7\")                                              BP Readings from Last 3 Encounters:   07/28/25 134/84   03/17/21 131/79   03/05/21 (!) 140/82       NPO Status:                                                                                 BMI:   Wt Readings from Last 3 Encounters:   07/25/25 127.5 kg (281 lb)   07/28/25 84.3 kg (185 lb 12.8 oz)   05/20/25 85.5 kg (188 lb 9.6 oz)     Body mass index is 44.01 kg/m².    CBC:   Lab Results   Component Value Date/Time    WBC 5.5 07/16/2025 02:08 PM    RBC 4.39 07/16/2025 02:08 PM    HGB 13.2 07/16/2025 02:08 PM    HCT 40.7 07/16/2025 02:08 PM    MCV 93 07/16/2025 02:08 PM    RDW 13.1 07/16/2025 02:08 PM     07/16/2025 02:08 PM       CMP:   Lab Results   Component Value Date/Time     07/16/2025 02:08 PM    K 4.2 07/16/2025 02:08 PM     07/16/2025 02:08 PM    CO2 21 07/16/2025 02:08 PM    BUN 33 07/16/2025 02:08 PM    CREATININE 1.7 07/16/2025 02:08 PM    GFRAA >60

## 2025-07-29 NOTE — ANESTHESIA PROCEDURE NOTES
Spinal Block    Patient location during procedure: holding area  End time: 7/29/2025 10:33 AM  Reason for block: primary anesthetic  Staffing  Performed: anesthesiologist   Performed by: Shalom Hernandez III, MD  Authorized by: Shalom Hernandez III, MD    Spinal Block  Patient position: sitting  Prep: ChloraPrep  Patient monitoring: cardiac monitor, continuous pulse ox, continuous capnometry, frequent blood pressure checks and oxygen  Approach: midline  Location: L4/L5  Provider prep: mask and sterile gloves  Local infiltration: lidocaine  Needle  Needle type: Dotty   Needle gauge: 25 G  Needle length: 3.5 in  Expiration date: 9/27/2025  Assessment  Sensory level: T10  Swirl obtained: Yes  CSF: clear  Attempts: 1  Hemodynamics: stable  Additional Notes  X 1 attempt without issue or comp  Preanesthetic Checklist  Completed: patient identified, IV checked, site marked, risks and benefits discussed, surgical/procedural consents, equipment checked, pre-op evaluation, timeout performed, anesthesia consent given, oxygen available, monitors applied/VS acknowledged, fire risk safety assessment completed and verbalized and blood product R/B/A discussed and consented

## 2025-07-29 NOTE — PERIOP NOTE
Pt unable to void.  Returned to chair bladder scan done 310 ml.  Pt given additional po fluids.  Pt states he feels like he will be able to void soon.

## 2025-07-29 NOTE — ANESTHESIA PROCEDURE NOTES
Peripheral Block    Patient location during procedure: holding area  Reason for block: post-op pain management  Start time: 7/29/2025 10:35 AM  End time: 7/29/2025 1:37 PM  Staffing  Performed: anesthesiologist   Performed by: Shalom Hernandez III, MD  Authorized by: Shalom Hernandez III, MD    Preanesthetic Checklist  Completed: patient identified, IV checked, site marked, risks and benefits discussed, surgical/procedural consents, equipment checked, pre-op evaluation, timeout performed, anesthesia consent given, oxygen available, monitors applied/VS acknowledged, fire risk safety assessment completed and verbalized and blood product R/B/A discussed and consented  Peripheral Block   Patient position: supine  Prep: ChloraPrep  Provider prep: mask and sterile gloves  Patient monitoring: cardiac monitor, continuous pulse ox, continuous capnometry, frequent blood pressure checks, IV access, oxygen and responsive to questions  Block type: Saphenous  Laterality: right  Injection technique: single-shot  Guidance: ultrasound guided    Needle   Needle type: insulated echogenic nerve stimulator needle   Needle gauge: 22 G  Needle localization: ultrasound guidance  Test dose: negative  Assessment   Injection assessment: negative aspiration for heme, no paresthesia on injection, local visualized surrounding nerve on ultrasound and no intravascular symptoms  Hemodynamics: stable  Outcomes: uncomplicated    Additional Notes  X 1 attempt without issue or comp, excellent spread, no evidence of intraneural or intravascular injection  Medications Administered  ropivacaine (NAROPIN) injection 0.2% - Perineural   20 mL - 7/29/2025 10:37:00 AM

## 2025-07-29 NOTE — PROGRESS NOTES
TOTAL KNEE REPLACEMENT   PHYSICAL THERAPY EVALUATION/DISCHARGE      Patient: Boston Boyd (80 y.o. male)  Date: 7/29/2025  Primary Diagnosis: Osteoarthritis of right knee [M17.11]  Status post total knee replacement, right [Z96.651]  Procedure(s) (LRB):  RIGHT TOTAL KNEE ARTHROPLASTY [ENOVIS] ADDUCTOR CANAL BLOCK 2 SA'S (Right) * Day of Surgery *   Precautions: Weight Bearing, Right Lower Extremity Weight Bearing: Weight Bearing As Tolerated,  ,  ,  ,  ,  ,    PLOF: Indep without AD, very active.       ASSESSMENT :   The patient presents POD #0 Right TKA is limited by decreased functional mobility, ROM, strength, activity tolerance, increased pain levels.    Based on the impairments listed above, the patient is mobilizing with expected post-op deficits. Patient was educated on weight bearing status and role of therapy. Objective assessment completed prior to mobilizing, patient demonstrates intact distal sensation, active ankle DF, and quad contraction. Patient completes functional transfers and ambulation x200ft using RW with modified independence. Patient went up/down 4 steps with verbal cues for correct sequencing and contact guard assist for safety, patient initially taking large steps with noticeable knee instability at end range with therapist educating patient on decreasing step length for safety. Education provided as detailed below, all patient/caregiver questions answered. At end of session patient sitting in recliner, call bell in reach, and ice on knee.      Patient is cleared for discharge from PT standpoint:  YES [x]   NO []    Vitals signs were stable during PT evaluation:  YES [x]   NO []    Patient has RW and it was adjusted appropriately:  YES [x]   NO []      Further Equipment Recommendations for Discharge: Patient has all needed DME for home safety.    [facility]: AM-PAC Inpatient Mobility Raw Score : 23      At this time and based on an AM-PAC score, no further PT is recommended upon discharge.

## 2025-07-29 NOTE — INTERVAL H&P NOTE
Update History & Physical    The patient's History and Physical of July 28, 2025 was reviewed with the patient and I examined the patient. There was no change. The surgical site was confirmed by the patient and me.     Plan: The risks, benefits, expected outcome, and alternative to the recommended procedure have been discussed with the patient. Patient understands and wants to proceed with the procedure.     Electronically signed by Jacques Parnell DO on 7/29/2025 at 9:51 AM

## 2025-07-29 NOTE — PROGRESS NOTES
1. Status post right knee replacement  -     ondansetron (ZOFRAN-ODT) 8 MG TBDP disintegrating tablet; Take 1 tablet by mouth every 8 hours as needed for Nausea or Vomiting, Disp-10 tablet, R-0Normal  -     acetaminophen (TYLENOL) 500 MG tablet; Take 2 tablets by mouth in the morning and 2 tablets at noon and 2 tablets in the evening., Disp-180 tablet, R-0Normal  -     traMADol (ULTRAM) 50 MG tablet; Take 1 tablet by mouth every 6 hours as needed for Pain for up to 7 days. Intended supply: 7 days. Take lowest dose possible to manage pain Max Daily Amount: 200 mg, Disp-28 tablet, R-0Normal  -     pantoprazole (PROTONIX) 40 MG tablet; Take 1 tablet by mouth daily, Disp-30 tablet, R-0Normal  -     oxyCODONE (ROXICODONE) 5 MG immediate release tablet; Take 1 tablet by mouth every 6 hours as needed for Pain for up to 7 days. Intended supply: 7 days. Take lowest dose possible to manage pain For breakthrough pain not controlled by tramadol. Not to be taken within 1 hour of tramadol. Max Daily Amount: 20 mg, Disp-10 tablet, R-0Normal  -     meloxicam (MOBIC) 15 MG tablet; Take 1 tablet by mouth daily Start with 1 pill the day before surgery and then daily therafter, Disp-30 tablet, R-1Normal  -     docusate sodium (COLACE) 100 MG capsule; Take 1 capsule by mouth 2 times daily, Disp-60 capsule, R-0Normal  -     aspirin (ASPIRIN 81) 81 MG chewable tablet; Take 1 tablet by mouth 2 times daily, Disp-60 tablet, R-0Normal  -     Ambulatory referral to Physical Therapy

## 2025-07-30 DIAGNOSIS — Z96.651 STATUS POST RIGHT KNEE REPLACEMENT: Primary | ICD-10-CM

## 2025-08-15 ENCOUNTER — TELEPHONE (OUTPATIENT)
Age: 80
End: 2025-08-15

## 2025-08-15 ENCOUNTER — OFFICE VISIT (OUTPATIENT)
Age: 80
End: 2025-08-15

## 2025-08-15 VITALS — WEIGHT: 188.4 LBS | BODY MASS INDEX: 29.57 KG/M2 | HEIGHT: 67 IN

## 2025-08-15 DIAGNOSIS — Z96.651 STATUS POST RIGHT KNEE REPLACEMENT: Primary | ICD-10-CM

## (undated) DEVICE — SYRINGE MED 50ML LUERLOCK TIP

## (undated) DEVICE — Z DISCONTINUED BY MEDLINE USE 2711682 TRAY SKIN PREP DRY W/ PREM GLV

## (undated) DEVICE — TAPE,CLOTH/SILK,CURAD,3"X10YD,LF,40/CS: Brand: CURAD

## (undated) DEVICE — SUTURE VCRL SZ 2 L27IN ABSRB VLT L65MM TP-1 1/2 CIR J649G

## (undated) DEVICE — SUTURE STRATAFIX SZ 3-0 L30CM NONABSORBABLE UD L19MM PS-2 SXMP2B408

## (undated) DEVICE — OPTIFOAM GENTLE SA, POSTOP, 4X12: Brand: MEDLINE

## (undated) DEVICE — AEGIS 1" DISK 4MM HOLE, PEEL OPEN: Brand: MEDLINE

## (undated) DEVICE — 2108 SERIES SAGITTAL BLADE (24.8 X 1.24 X 80.1MM)

## (undated) DEVICE — SUTURE VCRL SZ 1 L36IN ABSRB VLT L36MM CT-1 1/2 CIR J347H

## (undated) DEVICE — STERILE POLYISOPRENE POWDER-FREE SURGICAL GLOVES: Brand: PROTEXIS

## (undated) DEVICE — SUTURE VICRYL SZ 1 L18IN ABSRB VLT CT-1 L36MM 1/2 CIR J741D

## (undated) DEVICE — 4-PORT MANIFOLD: Brand: NEPTUNE 2

## (undated) DEVICE — DECANTER BAG 9": Brand: MEDLINE INDUSTRIES, INC.

## (undated) DEVICE — BANDAGE COMPR W6INXL5YD WHT BGE POLY COT M E WRP WV HK AND

## (undated) DEVICE — INTENDED FOR TISSUE SEPARATION, AND OTHER PROCEDURES THAT REQUIRE A SHARP SURGICAL BLADE TO PUNCTURE OR CUT.: Brand: BARD-PARKER SAFETY BLADES SIZE 10, STERILE

## (undated) DEVICE — MEPILEX POST OP 4X12 5BX

## (undated) DEVICE — SUTURE ABSORBABLE BRAIDED 2-0 CT-1 27 IN UD VICRYL J259H

## (undated) DEVICE — STRAP,POSITIONING,KNEE/BODY,FOAM,4X60": Brand: MEDLINE

## (undated) DEVICE — BLANKET WRM AD W50XL85.8IN PACU FULL BODY FORC AIR

## (undated) DEVICE — HANDPIECE SET WITH HIGH FLOW TIP AND SUCTION TUBE: Brand: INTERPULSE

## (undated) DEVICE — BOWL AND CEMENT CARTRIDGE WITH BREAKAWAY FEMORAL NOZZLE: Brand: ACM

## (undated) DEVICE — PREP SKN CHLRAPRP APL 26ML STR --

## (undated) DEVICE — SUTURE ETHIBOND EXCEL SZ 5 L30IN NONABSORBABLE GRN L40MM V-37 MB66G

## (undated) DEVICE — TOWEL,OR,DSP,ST,BLUE,STD,4/PK,20PK/CS: Brand: MEDLINE

## (undated) DEVICE — KIT CLN UP BON SECOURS MARYV

## (undated) DEVICE — SUTURE MONOCRYL SZ 2-0 L36IN ABSRB UD L36MM CT-1 1/2 CIR Y945H

## (undated) DEVICE — NEEDLE SPNL 18GA L3.5IN W/ QNCKE SHARPER BVL DURA CLICK

## (undated) DEVICE — BLADE SAW W098XL295IN THK005IN CUT THK0058IN REPL SAG W

## (undated) DEVICE — APPLICATOR MEDICATED 26 CC SOLUTION HI LT ORNG CHLORAPREP

## (undated) DEVICE — NEEDLE HYPO 18 GAX1.5 IN REG SAFETY PLAS HUB PNK DISP

## (undated) DEVICE — REM POLYHESIVE ADULT PATIENT RETURN ELECTRODE: Brand: VALLEYLAB

## (undated) DEVICE — PENCIL SMK EVAC ALL IN 1 DSGN ENH VISIBILITY IMPROVED AIR

## (undated) DEVICE — 1010 S-DRAPE TOWEL DRAPE 10/BX: Brand: STERI-DRAPE™

## (undated) DEVICE — PACK SURG CUST BSHR TOT KNEE LF MMC

## (undated) DEVICE — SOFT SILICONE HYDROCELLULAR SACRUM DRESSING WITH LOCK AWAY LAYER: Brand: ALLEVYN LIFE SACRUM (LARGE) PACK OF 10

## (undated) DEVICE — ADHESIVE SKIN CLOSURE WND 8.661X1.5 IN 22 CM LIQUIBAND SECUR

## (undated) DEVICE — 3M™ TEGADERM™ TRANSPARENT FILM DRESSING FRAME STYLE, 1626W, 4 IN X 4-3/4 IN (10 CM X 12 CM), 50/CT 4CT/CASE: Brand: 3M™ TEGADERM™

## (undated) DEVICE — NEEDLE HYPO 18GA L1.5IN PNK S STL HUB POLYPR SHLD REG BVL

## (undated) DEVICE — PADDING CAST W6INXL4YD ST COT COHESIVE HND TEARABLE SPEC

## (undated) DEVICE — SOLUTION IRRIG 1000ML 0.9% SOD CHL USP POUR PLAS BTL

## (undated) DEVICE — STRYKER PERFORMANCE SERIES SAGITTAL BLADE: Brand: STRYKER PERFORMANCE SERIES

## (undated) DEVICE — TAPE SURG W3INXL5.5YD E FOAM STRTCH HYPOALRG RL MICFOAM

## (undated) DEVICE — DRAPE TWL SURG 16X26IN BLU ORB04] ALLCARE INC]

## (undated) DEVICE — PACK SURG BSHR TOT KNEE LF

## (undated) DEVICE — SUT VCRL + 2-0 27IN CT1 UD --

## (undated) DEVICE — T4 HOOD

## (undated) DEVICE — BNDG CMPR ELAS KNT VEL 6INX5YD -- MEDICHOICE

## (undated) DEVICE — KIT EVAC 400CC DIA1/4IN H PAT 12.5IN 3 SPR RND SHP PVC DRN

## (undated) DEVICE — GARMENT,MEDLINE,DVT,SEQUENTIAL,CALF,MD: Brand: MEDLINE

## (undated) DEVICE — NEEDLE SPNL 22GA L3.5IN BLK HUB S STL REG WALL FIT STYL W/

## (undated) DEVICE — SOLUTION IRRIG 3000ML 0.9% SOD CHL FLX CONT 0797208] ICU MEDICAL INC]

## (undated) DEVICE — 3M™ STERI-DRAPE™ U-DRAPE 1015: Brand: STERI-DRAPE™

## (undated) DEVICE — BNDG,ELSTC,MATRIX,STRL,6"X5YD,LF,HOOK&LP: Brand: MEDLINE

## (undated) DEVICE — ZIMMER® STERILE DISPOSABLE TOURNIQUET CUFF WITH PROTECTIVE SLEEVE AND PLC, DUAL PORT, SINGLE BLADDER, 34 IN. (86 CM)

## (undated) DEVICE — SHEET,DRAPE,70X100,STERILE: Brand: MEDLINE

## (undated) DEVICE — CLEAN UP KIT: Brand: MEDLINE INDUSTRIES, INC.

## (undated) DEVICE — SOLUTION SURG SCRB 8OZ 4% STRENGTH CHG BTL HIBICLN

## (undated) DEVICE — ELASTIC BANDAGE 6": Brand: CARDINAL HEALTH

## (undated) DEVICE — RECIPROCATING BLADE HEAVY DUTY LONG, OFFSET  (77.6 X 0.77 X 11.2MM)

## (undated) DEVICE — Device

## (undated) DEVICE — ELECTRODE PT RET AD L9FT HI MOIST COND ADH HYDRGEL CORDED

## (undated) DEVICE — BIT DRL QR TOT KNEE 1/8IN SS -- DISP STRL 2/PK

## (undated) DEVICE — DRESSING FOAM DISK DIA1IN H 7MM HYDRPHLC CHG IMPREG IN SL

## (undated) DEVICE — BLADE, TONGUE, 6", STERILE: Brand: MEDLINE

## (undated) DEVICE — SOLUTION IV 1000ML 0.9% SOD CHL

## (undated) DEVICE — SUTURE ABSORBABLE MONOFILAMENT 1 OS-8 36 CM 40 MM VIO PDS +